# Patient Record
Sex: MALE | Employment: FULL TIME | URBAN - METROPOLITAN AREA
[De-identification: names, ages, dates, MRNs, and addresses within clinical notes are randomized per-mention and may not be internally consistent; named-entity substitution may affect disease eponyms.]

---

## 2018-09-11 ENCOUNTER — OFFICE VISIT (OUTPATIENT)
Dept: ORTHOPEDICS | Facility: CLINIC | Age: 43
End: 2018-09-11
Payer: COMMERCIAL

## 2018-09-11 VITALS
DIASTOLIC BLOOD PRESSURE: 71 MMHG | HEIGHT: 75 IN | BODY MASS INDEX: 24.25 KG/M2 | WEIGHT: 195 LBS | SYSTOLIC BLOOD PRESSURE: 122 MMHG

## 2018-09-11 DIAGNOSIS — M77.11 LATERAL EPICONDYLITIS OF RIGHT ELBOW: ICD-10-CM

## 2018-09-11 DIAGNOSIS — M22.2X1 PATELLOFEMORAL PAIN SYNDROME OF RIGHT KNEE: Primary | ICD-10-CM

## 2018-09-11 PROCEDURE — 99204 OFFICE O/P NEW MOD 45 MIN: CPT | Performed by: FAMILY MEDICINE

## 2018-09-11 NOTE — MR AVS SNAPSHOT
After Visit Summary   9/11/2018    Otoniel Montero    MRN: [id number]           Patient Information     Date Of Birth          1975        Visit Information        Provider Department      9/11/2018 2:40 PM Kvng Gastelum MD [facility] [place]        Today's Diagnoses     Patellofemoral pain syndrome of right knee    -  1    Lateral epicondylitis of right elbow           Follow-ups after your visit        Additional Services     JU[facility]U PT, HAND, AND CHIROPRACTIC REFERRAL       **This order will print in the [facility] Scheduling Office**    Physical Therapy, Hand Therapy and Chiropractic Care are available through:    *[facility]  *[facility]  *[facility]    Call one number to schedule at any of the above locations: [phone].    Your provider has referred you to: Physical Therapy at [facility] or [facility]    Has appt with Tammie in  already    Indication/Reason for Referral:    Patellofemoral pain syndrome of right knee      Onset of Illness:   Therapy Orders: Evaluate and Treat  Special Programs: None  Special Request: None    [place]      Additional Comments for the Therapist or Chiropractor:     Please be aware that coverage of these services is subject to the terms and limitations of your health insurance plan.  Call member services at your health plan with any benefit or coverage questions.      Please bring the following to your appointment:    *Your personal calendar for scheduling future appointments  *Comfortable clothing                  Your next 10 appointments already scheduled     Sep 19, 2018 12:00 PM CDT   (Arrive by 11:45 AM)   JUJU Running with Tammie Jones PT   [facility] ([facility] [place]  )    [address] [place] [address] [address] [place] [id number]   [phone]            Sep 27, 2018 10:40 AM CDT   JUJU Running with Tammie Jones PT  "  [place] [facility] ([facility]  )    [address]  [facility] [id number] [phone]            Oct 04, 2018 10:40 AM CDT   JUJU Running with Tammie Jones, PT   [place] [facility] ([facility]  )    [address]  [facility] [address]   [phone]              Who to contact     If you have questions or need follow up information about today's clinic visit or your schedule please contact [facility] [place] directly at [phone].  Normal or non-critical lab and imaging results will be communicated to you by MyChart, letter or phone within 4 business days after the clinic has received the results. If you do not hear from us within 7 days, please contact the clinic through Montrue Technologieshart or phone. If you have a critical or abnormal lab result, we will notify you by phone as soon as possible.  Submit refill requests through Nintu Oy or call your pharmacy and they will forward the refill request to us. Please allow 3 business days for your refill to be completed.          Additional Information About Your Visit        MyChart Information     [employer] lets you send messages to your doctor, view your test results, renew your prescriptions, schedule appointments and more. To sign up, go to [email or web address] . Click on \"Log in\" on the left side of the screen, which will take you to the Welcome page. Then click on \"Sign up Now\" on the right side of the page.     You will be asked to enter the access code listed below, as well as some personal information. Please follow the directions to create your username and password.     Your access code is: [email or web address]  Expires: 12/10/2018  3:40 PM     Your access code will  in 90 days. If you need help or a new code, please call your [place] clinic or [phone].        Care EveryWhere ID     This is your Care EveryWhere ID. This " "could be used by other organizations to access your [place] medical records  [id number]        Your Vitals Were     Height BMI (Body Mass Index)                6' 3\" (1.905 m) 24.37 kg/m2           Blood Pressure from Last 3 Encounters:   09/11/18 122/71    Weight from Last 3 Encounters:   09/11/18 195 lb (88.5 kg)              We Performed the Following     JUJU PT, HAND, AND CHIROPRACTIC REFERRAL        Primary Care Provider    None Specified       No primary provider on file.        Equal Access to Services     RONNY LOPEZ : Hadii aad ku hadasho Soomaali, waaxda luqadaha, qaybta kaalmada adeegyada, waxay stivenin haymarin ademurtaza corona lalexa . So [facility] [phone].    ATENCIÓN: Si habla español, tiene a humphrey disposición servicios gratuitos de asistencia lingüística. Llame al [phone].    We comply with applicable federal civil rights laws and [place] laws. We do not discriminate on the basis of race, color, national origin, age, disability, sex, sexual orientation, or gender identity.            Thank you!     Thank you for choosing [facility] [place]  for your care. Our goal is always to provide you with excellent care. Hearing back from our patients is one way we can continue to improve our services. Please take a few minutes to complete the written survey that you may receive in the mail after your visit with us. Thank you!             Your Updated Medication List - Protect others around you: Learn how to safely use, store and throw away your medicines at www.disposemymeds.org.      Notice  As of 9/11/2018  3:40 PM    You have not been prescribed any medications.      "

## 2018-09-11 NOTE — PROGRESS NOTES
[facility]   Clinic Visit s Sep 11, 2018    PCP: No primary care provider on file.      Otoniel is a 42 year old male who is seen as self referral for   Chief Complaint   Patient presents with     Right Knee - Pain       Injury: Reports insidious onset without acute precipitating event. Regular run. Out for 10 miles about a week ago, sudden onset anterolateral pain at mile 8, after picking up speed.        Location of Pain: right knee anterior  and lateral, nonradiating   Duration of Pain: 1 week(s)  Rating of Pain at worst: 0/10  Rating of Pain Currently: 0/10, pressure  Pain is better with: activity avoidance and rest   Pain is worse with: running   Treatment so far consists of: activity avoidance and rest  Associated symptoms: no distal numbness or tingling; denies swelling or warmth  Recent imaging completed: No recent imaging completed.  Prior History of related problems: PFS while in the [employer] 2006, 2 ankle scope surgeries.    Has PHYSICAL THERAPY with will set up.     Social History: is employed as a/an         Patient Active Problem List    Diagnosis Date Noted     Chronic pain of left ankle 07/11/2015     Priority: Medium     Disturbance of skin sensation 05/29/2015     Priority: Medium     Impingement of left ulnar nerve 05/29/2015     Priority: Medium     Cervicalgia 05/22/2015     Priority: Medium     Pain in thoracic spine 05/22/2015     Priority: Medium     Ulnar neuritis 05/22/2015     Priority: Medium     Pain in joint involving ankle and foot 05/22/2015     Priority: Medium       Family History   Problem Relation Age of Onset     Family history unknown: Yes     Past Surgical History:   Procedure Laterality Date     ANKLE SURGERY Left     x2, in army       Social History     Social History     Marital status:      Spouse name: N/A     Number of children: N/A     Years of education: N/A     Social History Main Topics     Smoking status: Never Smoker  "    Smokeless tobacco: Not on file     Alcohol use No       Review of Systems   Musculoskeletal: Positive for joint pain.   All other systems reviewed and are negative.        Physical Exam   Musculoskeletal:        Right elbow: Lateral epicondyle tenderness noted.     /71  Ht 6' 3\" (1.905 m)  Wt 195 lb (88.5 kg)  BMI 24.37 kg/m2  Constitutional:well-developed, well-nourished, and in no distress.   Cardiovascular: Intact distal pulses.    Neurological: alert. Gait Normal:   Gait, station, stance, and balance appear normal for age  Skin: Skin is warm and dry.   Psychiatric: Mood and affect normal.   Respiratory: unlabored, speaks in full sentences  Lymph: no LAD, no lymphangitis    Right Knee Exam   Swelling: None  Effusion: No    Tenderness   The patient is experiencing tenderness in the Lateral and medial patella facets.    Range of Motion   Extension: Normal  Flexion:     Normal    Tests   McMurrays:  Medial - Negative      Lateral - Negative  Lachman:  Anterior - Negative    Posterior - Negative  Drawer:       Anterior - Negative    Posterior - Negative  Varus:  Negative  Valgus: Negative  Pivot Shift: Negative  Patellar Apprehension: No    Right Elbow Exam     Tenderness   The patient is experiencing tenderness in the lateral epicondyle.    Range of Motion   Extension:  Normal  Flexion:      Normal  Pronation:   Normal  Supination: Normal    Muscle Strength   Wrist Extension: 5/5  Wrist Flexion:     5/5  :                   5/5  Pronation:           5/5  Supination:         5/5    Tests   Varus:   Negative  Valgus:  Negative  Tinels Sign (Cubital tunnel): n/t    Comments:  Posterior pain at full extension.  Pain with  and resisted wrist extension, full strength            ASSESSMENT/PLAN    ICD-10-CM    1. Patellofemoral pain syndrome of right knee M22.2X1 J[facility]U PT, HAND, AND CHIROPRACTIC REFERRAL   2. Lateral epicondylitis of right elbow M77.11          Explained nature of syndrome as a patella " "maltracking issue, now seemingly more related to poor hip mechanics affecting the biomechanical function of the entire leg, leaving the patella to move in an inappropriate or dysfunctional manner in the patellar groove. Physical therapy aimed at restoring correct patella tracking by restoring overall normal leg function recommended.  This includes avoidance of quad exercises especially leg extensions and emphasis on hip abductor and extensor strengthening. Noted mechanism for hip instability causing altered patellofemoral mechanics.  Recheck 4-6 weeks if not better.    Nature of injury discussed noting degenerative nature of these injuries as opposed to inflammatory conditions.  Treatment options reviewed including continued rest, therapy either home program or formal hand therapy referral, and injection of cortisone or irritant therapy protocols.    Declines hand therapy referral. Instead opts for home rehab program. Instructed in eccentric exercises, a \"longer and stronger\" concept, which is useful in building a stronger and more functional muscle and tendon unit. This can be performed with free weights, a urban twist device, or Therabands. Individualized rehab program designed for patient during visit.     Time spent in one-on-one evalution and discussion with a new patient to this clinic and so  I reviewed/updated the Past Medical History, the Family History and the Social History   regarding nature of problem, course, prior treatments, and therapeutic options, at least 50% of which was spent in counseling and coordination of care: 45 minutes.  "

## 2018-09-11 NOTE — Clinical Note
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 Intense but pleasant, and minimally symptomatic now.

## 2018-09-19 ENCOUNTER — THERAPY VISIT (OUTPATIENT)
Dept: PHYSICAL THERAPY | Facility: CLINIC | Age: 43
End: 2018-09-19
Payer: COMMERCIAL

## 2018-09-19 DIAGNOSIS — M25.561 RIGHT KNEE PAIN: Primary | ICD-10-CM

## 2018-09-19 PROCEDURE — 97110 THERAPEUTIC EXERCISES: CPT | Mod: GP | Performed by: PHYSICAL THERAPIST

## 2018-09-19 PROCEDURE — 97161 PT EVAL LOW COMPLEX 20 MIN: CPT | Mod: GP | Performed by: PHYSICAL THERAPIST

## 2018-09-19 ASSESSMENT — ACTIVITIES OF DAILY LIVING (ADL)
AS_A_RESULT_OF_YOUR_KNEE_INJURY,_HOW_WOULD_YOU_RATE_YOUR_CURRENT_LEVEL_OF_DAILY_ACTIVITY?: NORMAL
WEAKNESS: I DO NOT HAVE THE SYMPTOM
GIVING WAY, BUCKLING OR SHIFTING OF KNEE: I DO NOT HAVE THE SYMPTOM
STAND: ACTIVITY IS NOT DIFFICULT
HOW_WOULD_YOU_RATE_THE_OVERALL_FUNCTION_OF_YOUR_KNEE_DURING_YOUR_USUAL_DAILY_ACTIVITIES?: NEARLY NORMAL
SIT WITH YOUR KNEE BENT: ACTIVITY IS NOT DIFFICULT
KNEEL ON THE FRONT OF YOUR KNEE: ACTIVITY IS NOT DIFFICULT
SQUAT: ACTIVITY IS NOT DIFFICULT
HOW_WOULD_YOU_RATE_THE_CURRENT_FUNCTION_OF_YOUR_KNEE_DURING_YOUR_USUAL_DAILY_ACTIVITIES_ON_A_SCALE_FROM_0_TO_100_WITH_100_BEING_YOUR_LEVEL_OF_KNEE_FUNCTION_PRIOR_TO_YOUR_INJURY_AND_0_BEING_THE_INABILITY_TO_PERFORM_ANY_OF_YOUR_USUAL_DAILY_ACTIVITIES?: 10
KNEE_ACTIVITY_OF_DAILY_LIVING_SCORE: 100
STIFFNESS: I DO NOT HAVE THE SYMPTOM
RAW_SCORE: 70
PAIN: I DO NOT HAVE THE SYMPTOM
WALK: ACTIVITY IS NOT DIFFICULT
LIMPING: I DO NOT HAVE THE SYMPTOM
GO DOWN STAIRS: ACTIVITY IS NOT DIFFICULT
KNEE_ACTIVITY_OF_DAILY_LIVING_SUM: 70
GO UP STAIRS: ACTIVITY IS NOT DIFFICULT
RISE FROM A CHAIR: ACTIVITY IS NOT DIFFICULT
SWELLING: I DO NOT HAVE THE SYMPTOM

## 2018-09-19 NOTE — MR AVS SNAPSHOT
"              After Visit Summary   9/19/2018    Otoniel Montero    MRN: [id number]           Patient Information     Date Of Birth          1975        Visit Information        Provider Department      9/19/2018 12:00 PM Tammie Jones, PT [facility]        Today's Diagnoses     Right knee pain    -  1       Follow-ups after your visit        Your next 10 appointments already scheduled     Sep 27, 2018 10:40 AM CDT   JUJU Running with Tammie Jones PT   [place] [facility] ([facility] [place]  )    [address]  [facility] [id number] [phone]            Oct 04, 2018 10:40 AM CDT   JUJU Running with Tammie Jones PT   [place] [facility] ([facility] [place]  )    [address] [place] [address] [address]  [facility] [id number] [phone]              Who to contact     If you have questions or need follow up information about today's clinic visit or your schedule please contact [facility] PHYSICAL THERAPY directly at [phone].  Normal or non-critical lab and imaging results will be communicated to you by Fonmatchhart, letter or phone within 4 business days after the clinic has received the results. If you do not hear from us within 7 days, please contact the clinic through [employer] or phone. If you have a critical or abnormal lab result, we will notify you by phone as soon as possible.  Submit refill requests through [employer] or call your pharmacy and they will forward the refill request to us. Please allow 3 business days for your refill to be completed.          Additional Information About Your Visit        MyChart Information     X[employer]ia lets you send messages to your doctor, view your test results, renew your prescriptions, schedule appointments and more. To sign up, go to [email or web address] . Click on \"Log in\" on the left side of the screen, which " "will take you to the Welcome page. Then click on \"Sign up Now\" on the right side of the page.     You will be asked to enter the access code listed below, as well as some personal information. Please follow the directions to create your username and password.     Your access code is: [email or web address]  Expires: 12/10/2018  3:40 PM     Your access code will  in 90 days. If you need help or a new code, please call your [place] clinic or [phone].        Care EveryWhere ID     This is your Care EveryWhere ID. This could be used by other organizations to access your [place] medical records  [id number]         Blood Pressure from Last 3 Encounters:   18 122/71    Weight from Last 3 Encounters:   18 88.5 kg (195 lb)              We Performed the Following     HC PT EVAL, LOW COMPLEXITY     JUJU INITIAL EVAL REPORT     THERAPEUTIC EXERCISES        Primary Care Provider Fax #    Physician No Ref-Primary [phone]       No address on file        Equal Access to Services     RONNY LOPEZ : Hadii aad ku hadasho Soomaali, waaxda luqadaha, qaybta kaalmada adeegyada, waxay stivenin linda cordova . So [facility] [phone].    ATENCIÓN: Si habla español, tiene a humphrey disposición servicios gratuitos de asistencia lingüística. Llame al [phone].    We comply with applicable federal civil rights laws and [place] laws. We do not discriminate on the basis of race, color, national origin, age, disability, sex, sexual orientation, or gender identity.            Thank you!     Thank you for choosing [facility] PHYSICAL THERAPY  for your care. Our goal is always to provide you with excellent care. Hearing back from our patients is one way we can continue to improve our services. Please take a few minutes to complete the written survey that you may receive in the mail after your visit with us. Thank you!             Your Updated Medication List - Protect others around you: Learn " how to safely use, store and throw away your medicines at www.disposemymeds.org.      Notice  As of 9/19/2018 11:59 PM    You have not been prescribed any medications.

## 2018-09-24 NOTE — PROGRESS NOTES
[facility] Initial Evaluation  Subjective:  Patient is a 43 year old male presenting with rehab right knee hpi.           Pt reports onset of R inferior lateral patellar aching tight and occasionally sharp pain with running. He has had knee pain with previous running for work physical training 2 miles.  In June 2018 he increased running up to half marathon distance by early September. He has changed his training to attempt to run midfoot strike and is using HR monitor for training intensity. .          and reported as 1/10.   Pain is worse during the day.  Symptoms are exacerbated by running and standing and relieved by rest.  Since onset symptoms are gradually worsening.        General health as reported by patient is excellent.                      Red flags:  None as reported by the patient.                        Objective:  Standing Alignment:                  General deviations alignment: hips mild shift to tthe R, dec lumbar lordosis,  Gait:  R trunk rotation in mid to R terminal stance with increased stride, dec push off R R hip hike    Deviations:  General Deviations:  Stride length decr    Flexibility/Screens:   Positive screens:  Hip (mod dec R hip ER IR with firm end feel, ) and Lumbar (max loss R SLS lumbar ext, lumbar ext mod loss, side glide hips moving L min loss )    Lower Extremity:  Decreased left lower extremity flexibility:Hip ER's and Hip Flexors    Decreased right lower extremity flexibility:  Hip IR's; Hip ER's; Adductors; Piriformis and Hip Flexors          Ankle/Foot Evaluation  ROM:      PROM:                  Endfeel:  Firm end feel L DF min loss PROM                                                           Knee Evaluation:  ROM:      PROM      Extension: Left:   Right:  Min loss with firm end feel               Palpation:      Right knee tenderness present at:  IT Band (distal VL/ITB tenderness min restriction)            General     ROS    Assessment/Plan:     Patient is a 43 year old male with right side knee and left side ankle complaints.    Patient has the following significant findings with corresponding treatment plan.                Diagnosis 1:  R knee L ankle pain   Pain -  hot/cold therapy, manual therapy and self management  Decreased ROM/flexibility - manual therapy and therapeutic exercise  Decreased joint mobility - manual therapy and therapeutic exercise  Impaired gait - gait training  Impaired muscle performance - neuro re-education  Decreased function - therapeutic activities  Impaired posture - neuro re-education    Therapy Evaluation Codes:   1) History comprised of:   Personal factors that impact the plan of care:      None.    Comorbidity factors that impact the plan of care are:      None.     Medications impacting care: None.  2) Examination of Body Systems comprised of:   Body structures and functions that impact the plan of care:      Ankle, Hip, Knee, Lumbar spine and Pelvis.   Activity limitations that impact the plan of care are:      Sports, Standing and Walking.  3) Clinical presentation characteristics are:   Stable/Uncomplicated.  4) Decision-Making    Low complexity using standardized patient assessment instrument and/or measureable assessment of functional outcome.  Cumulative Therapy Evaluation is: Low complexity.    Previous and current functional limitations:  (See Goal Flow Sheet for this information)    Short term and Long term goals: (See Goal Flow Sheet for this information)     Communication ability:  Patient appears to be able to clearly communicate and understand verbal and written communication and follow directions correctly.  Treatment Explanation - The following has been discussed with the patient:   RX ordered/plan of care  Anticipated outcomes  Possible risks and side effects  This patient would benefit from PT intervention to resume normal activities.   Rehab potential is excellent.    Frequency:  1 X week, once  daily  Duration:  for 6 weeks  Discharge Plan:  Achieve all LTG.  Independent in home treatment program.  Reach maximal therapeutic benefit.    Please refer to the daily flowsheet for treatment today, total treatment time and time spent performing 1:1 timed codes.

## 2018-09-25 PROBLEM — M25.561 RIGHT KNEE PAIN: Status: ACTIVE | Noted: 2018-09-25

## 2018-09-25 NOTE — PROGRESS NOTES
[facility] Initial Evaluation  Subjective:  Patient is a 43 year old male presenting with rehab general hpi.   General   Past medical history: High eye pressure.  Other surgeries:  Orthopedic surgery and other (L angke scope [id number], lasik eye, tonsils 1977)  Medication history: Xalodan eye drop.  Occupation comment:  [employer]    What are your primary job tasks:  Driving, prolonged sitting and prolonged standing (computer work, pushing/pulling)                      Objective:  System    Physical Exam    General     ROS    Assessment/Plan:

## 2018-09-27 ENCOUNTER — THERAPY VISIT (OUTPATIENT)
Dept: PHYSICAL THERAPY | Facility: CLINIC | Age: 43
End: 2018-09-27
Payer: COMMERCIAL

## 2018-09-27 DIAGNOSIS — M25.561 RIGHT KNEE PAIN: ICD-10-CM

## 2018-09-27 PROCEDURE — 97110 THERAPEUTIC EXERCISES: CPT | Mod: GP | Performed by: PHYSICAL THERAPIST

## 2018-09-27 PROCEDURE — 97140 MANUAL THERAPY 1/> REGIONS: CPT | Mod: GP | Performed by: PHYSICAL THERAPIST

## 2018-10-04 ENCOUNTER — THERAPY VISIT (OUTPATIENT)
Dept: PHYSICAL THERAPY | Facility: CLINIC | Age: 43
End: 2018-10-04
Payer: COMMERCIAL

## 2018-10-04 DIAGNOSIS — M25.561 RIGHT KNEE PAIN: ICD-10-CM

## 2018-10-04 PROCEDURE — 97112 NEUROMUSCULAR REEDUCATION: CPT | Mod: GP | Performed by: PHYSICAL THERAPIST

## 2018-10-04 PROCEDURE — 97140 MANUAL THERAPY 1/> REGIONS: CPT | Mod: GP | Performed by: PHYSICAL THERAPIST

## 2018-10-04 PROCEDURE — 97110 THERAPEUTIC EXERCISES: CPT | Mod: GP | Performed by: PHYSICAL THERAPIST

## 2018-10-10 ENCOUNTER — THERAPY VISIT (OUTPATIENT)
Dept: PHYSICAL THERAPY | Facility: CLINIC | Age: 43
End: 2018-10-10
Payer: COMMERCIAL

## 2018-10-10 DIAGNOSIS — M25.561 RIGHT KNEE PAIN: ICD-10-CM

## 2018-10-10 PROCEDURE — 97110 THERAPEUTIC EXERCISES: CPT | Mod: GP | Performed by: PHYSICAL THERAPIST

## 2018-10-10 PROCEDURE — 97140 MANUAL THERAPY 1/> REGIONS: CPT | Mod: GP | Performed by: PHYSICAL THERAPIST

## 2018-10-18 ENCOUNTER — THERAPY VISIT (OUTPATIENT)
Dept: PHYSICAL THERAPY | Facility: CLINIC | Age: 43
End: 2018-10-18
Payer: COMMERCIAL

## 2018-10-18 DIAGNOSIS — M25.561 RIGHT KNEE PAIN: ICD-10-CM

## 2018-10-18 PROCEDURE — 97140 MANUAL THERAPY 1/> REGIONS: CPT | Mod: GP | Performed by: PHYSICAL THERAPIST

## 2018-10-18 PROCEDURE — 97110 THERAPEUTIC EXERCISES: CPT | Mod: GP | Performed by: PHYSICAL THERAPIST

## 2018-10-25 ENCOUNTER — THERAPY VISIT (OUTPATIENT)
Dept: PHYSICAL THERAPY | Facility: CLINIC | Age: 43
End: 2018-10-25
Payer: COMMERCIAL

## 2018-10-25 DIAGNOSIS — M25.561 RIGHT KNEE PAIN: ICD-10-CM

## 2018-10-25 PROCEDURE — 97110 THERAPEUTIC EXERCISES: CPT | Mod: GP | Performed by: PHYSICAL THERAPIST

## 2018-10-25 PROCEDURE — 97140 MANUAL THERAPY 1/> REGIONS: CPT | Mod: GP | Performed by: PHYSICAL THERAPIST

## 2019-01-10 ENCOUNTER — THERAPY VISIT (OUTPATIENT)
Dept: PHYSICAL THERAPY | Facility: CLINIC | Age: 44
End: 2019-01-10
Payer: COMMERCIAL

## 2019-01-10 DIAGNOSIS — M25.561 RIGHT KNEE PAIN: ICD-10-CM

## 2019-01-10 PROCEDURE — 97110 THERAPEUTIC EXERCISES: CPT | Mod: GP | Performed by: PHYSICAL THERAPIST

## 2019-01-10 PROCEDURE — 97140 MANUAL THERAPY 1/> REGIONS: CPT | Mod: GP | Performed by: PHYSICAL THERAPIST

## 2019-02-06 ENCOUNTER — THERAPY VISIT (OUTPATIENT)
Dept: PHYSICAL THERAPY | Facility: CLINIC | Age: 44
End: 2019-02-06
Payer: COMMERCIAL

## 2019-02-06 DIAGNOSIS — S93.409A ANKLE SPRAIN: ICD-10-CM

## 2019-02-06 PROCEDURE — 97161 PT EVAL LOW COMPLEX 20 MIN: CPT | Mod: GP | Performed by: PHYSICAL THERAPIST

## 2019-02-06 PROCEDURE — 97110 THERAPEUTIC EXERCISES: CPT | Mod: GP | Performed by: PHYSICAL THERAPIST

## 2019-02-11 PROBLEM — S93.409A ANKLE SPRAIN: Status: ACTIVE | Noted: 2019-02-11

## 2019-02-11 NOTE — PROGRESS NOTES
[facility] Initial Evaluation  Subjective:  Pt reports onset of L lateral ankle pain after landing from a move during a martial arts testing session on Jan 18, 2019. He notes that it was bruised and swollen after the injury. He has iced, rested, and now is returning to running. He has lateral and anterior ankle aching pain with stiffness going down stairs, and after running. He has had prior PT for R knee pain and had done well managing with HEP. He is working to run marathon distance.                             Objective:    Gait:      Deviations:  Ankle:  Push off decr LGeneral Deviations:  Stance time decr    Flexibility/Screens:   Positive screens:  Hip (mod loss R hip ER - has had rx focus R knee and hip) and Lumbar (mod loss ext )              Ankle/Foot Evaluation  ROM:      PROM:                  Endfeel:  Firm end feel min loss DF        PALPATION:   Left ankle tenderness present at:  gastroc/soleus and peroneals    EDEMA: Edema ankle: mild lateral L foot/ankle.          MOBILITY TESTING:       Talocrural Left: hypomobile      Subtalar Left: hypomobile          FUNCTIONAL TESTS:         Quad:  Single Leg Squat Left: Control is mild loss of control.  Single Leg Squat Right: Control is moderate loss of control                                                          General     ROS    Assessment/Plan:    Patient is a 43 year old male with left side ankle complaints.    Patient has the following significant findings with corresponding treatment plan.                Diagnosis 1:  Lateral ankle sprain  Pain -  hot/cold therapy, manual therapy and self management  Decreased ROM/flexibility - manual therapy and therapeutic exercise  Decreased joint mobility - manual therapy and therapeutic exercise  Decreased strength - therapeutic exercise and therapeutic activities  Edema - vasopneumatics, electric stimulation and cold therapy  Impaired gait - gait training  Impaired muscle performance -  neuro re-education  Decreased function - therapeutic activities  Instability -  Therapeutic Activity  Therapeutic Exercise    Therapy Evaluation Codes:     Cumulative Therapy Evaluation is: Low complexity.    Previous and current functional limitations:  (See Goal Flow Sheet for this information)    Short term and Long term goals: (See Goal Flow Sheet for this information)     Communication ability:  Patient appears to be able to clearly communicate and understand verbal and written communication and follow directions correctly.  Treatment Explanation - The following has been discussed with the patient:   RX ordered/plan of care  Anticipated outcomes  Possible risks and side effects  This patient would benefit from PT intervention to resume normal activities.   Rehab potential is excellent.    Frequency:  1 X week, once daily  Duration:  for 4 weeks  Discharge Plan:  Achieve all LTG.  Independent in home treatment program.  Reach maximal therapeutic benefit.    Please refer to the daily flowsheet for treatment today, total treatment time and time spent performing 1:1 timed codes.

## 2019-02-18 ENCOUNTER — THERAPY VISIT (OUTPATIENT)
Dept: PHYSICAL THERAPY | Facility: CLINIC | Age: 44
End: 2019-02-18
Payer: COMMERCIAL

## 2019-02-18 DIAGNOSIS — S93.409A ANKLE SPRAIN: ICD-10-CM

## 2019-02-18 PROCEDURE — 97110 THERAPEUTIC EXERCISES: CPT | Mod: GP | Performed by: PHYSICAL THERAPIST

## 2019-02-18 PROCEDURE — 97140 MANUAL THERAPY 1/> REGIONS: CPT | Mod: GP | Performed by: PHYSICAL THERAPIST

## 2019-02-18 PROCEDURE — 97112 NEUROMUSCULAR REEDUCATION: CPT | Mod: GP | Performed by: PHYSICAL THERAPIST

## 2019-03-12 ENCOUNTER — THERAPY VISIT (OUTPATIENT)
Dept: PHYSICAL THERAPY | Facility: CLINIC | Age: 44
End: 2019-03-12
Payer: COMMERCIAL

## 2019-03-12 DIAGNOSIS — S93.409A ANKLE SPRAIN: ICD-10-CM

## 2019-03-12 PROCEDURE — 97110 THERAPEUTIC EXERCISES: CPT | Mod: GP | Performed by: PHYSICAL THERAPIST

## 2019-03-12 PROCEDURE — 97140 MANUAL THERAPY 1/> REGIONS: CPT | Mod: GP | Performed by: PHYSICAL THERAPIST

## 2019-04-04 ENCOUNTER — THERAPY VISIT (OUTPATIENT)
Dept: PHYSICAL THERAPY | Facility: CLINIC | Age: 44
End: 2019-04-04
Payer: COMMERCIAL

## 2019-04-04 DIAGNOSIS — S93.409A ANKLE SPRAIN: ICD-10-CM

## 2019-04-04 PROCEDURE — 97140 MANUAL THERAPY 1/> REGIONS: CPT | Mod: GP | Performed by: PHYSICAL THERAPIST

## 2019-04-04 PROCEDURE — 97110 THERAPEUTIC EXERCISES: CPT | Mod: GP | Performed by: PHYSICAL THERAPIST

## 2019-04-30 ENCOUNTER — THERAPY VISIT (OUTPATIENT)
Dept: PHYSICAL THERAPY | Facility: CLINIC | Age: 44
End: 2019-04-30
Payer: COMMERCIAL

## 2019-04-30 DIAGNOSIS — S93.409A ANKLE SPRAIN: ICD-10-CM

## 2019-04-30 PROCEDURE — 97110 THERAPEUTIC EXERCISES: CPT | Mod: GP | Performed by: PHYSICAL THERAPIST

## 2019-04-30 PROCEDURE — 97140 MANUAL THERAPY 1/> REGIONS: CPT | Mod: GP | Performed by: PHYSICAL THERAPIST

## 2019-05-21 ENCOUNTER — THERAPY VISIT (OUTPATIENT)
Dept: PHYSICAL THERAPY | Facility: CLINIC | Age: 44
End: 2019-05-21
Payer: COMMERCIAL

## 2019-05-21 DIAGNOSIS — S93.409A ANKLE SPRAIN: ICD-10-CM

## 2019-05-21 PROCEDURE — 97110 THERAPEUTIC EXERCISES: CPT | Mod: GP | Performed by: PHYSICAL THERAPIST

## 2019-05-21 PROCEDURE — 97140 MANUAL THERAPY 1/> REGIONS: CPT | Mod: GP | Performed by: PHYSICAL THERAPIST

## 2019-05-30 ENCOUNTER — THERAPY VISIT (OUTPATIENT)
Dept: PHYSICAL THERAPY | Facility: CLINIC | Age: 44
End: 2019-05-30
Payer: COMMERCIAL

## 2019-05-30 DIAGNOSIS — S93.409A ANKLE SPRAIN: ICD-10-CM

## 2019-05-30 PROCEDURE — 97140 MANUAL THERAPY 1/> REGIONS: CPT | Mod: GP | Performed by: PHYSICAL THERAPIST

## 2019-05-30 PROCEDURE — 97112 NEUROMUSCULAR REEDUCATION: CPT | Mod: GP | Performed by: PHYSICAL THERAPIST

## 2019-05-30 PROCEDURE — 97110 THERAPEUTIC EXERCISES: CPT | Mod: GP | Performed by: PHYSICAL THERAPIST

## 2019-07-16 ENCOUNTER — THERAPY VISIT (OUTPATIENT)
Dept: PHYSICAL THERAPY | Facility: CLINIC | Age: 44
End: 2019-07-16
Payer: COMMERCIAL

## 2019-07-16 DIAGNOSIS — S93.409A ANKLE SPRAIN: ICD-10-CM

## 2019-07-16 PROCEDURE — 97110 THERAPEUTIC EXERCISES: CPT | Mod: GP | Performed by: PHYSICAL THERAPIST

## 2019-07-16 PROCEDURE — 97140 MANUAL THERAPY 1/> REGIONS: CPT | Mod: GP | Performed by: PHYSICAL THERAPIST

## 2019-07-16 PROCEDURE — 97530 THERAPEUTIC ACTIVITIES: CPT | Mod: GP | Performed by: PHYSICAL THERAPIST

## 2019-08-29 ENCOUNTER — THERAPY VISIT (OUTPATIENT)
Dept: PHYSICAL THERAPY | Facility: CLINIC | Age: 44
End: 2019-08-29
Payer: COMMERCIAL

## 2019-08-29 DIAGNOSIS — S93.409A ANKLE SPRAIN: ICD-10-CM

## 2019-08-29 PROCEDURE — 97110 THERAPEUTIC EXERCISES: CPT | Mod: GP | Performed by: PHYSICAL THERAPIST

## 2019-08-29 PROCEDURE — 97140 MANUAL THERAPY 1/> REGIONS: CPT | Mod: GP | Performed by: PHYSICAL THERAPIST

## 2019-09-23 ENCOUNTER — THERAPY VISIT (OUTPATIENT)
Dept: PHYSICAL THERAPY | Facility: CLINIC | Age: 44
End: 2019-09-23
Payer: COMMERCIAL

## 2019-09-23 DIAGNOSIS — S93.409A ANKLE SPRAIN: ICD-10-CM

## 2019-09-23 PROCEDURE — 97110 THERAPEUTIC EXERCISES: CPT | Mod: GP | Performed by: PHYSICAL THERAPIST

## 2019-09-23 PROCEDURE — 97140 MANUAL THERAPY 1/> REGIONS: CPT | Mod: GP | Performed by: PHYSICAL THERAPIST

## 2019-09-23 PROCEDURE — 97530 THERAPEUTIC ACTIVITIES: CPT | Mod: GP | Performed by: PHYSICAL THERAPIST

## 2019-11-05 ENCOUNTER — THERAPY VISIT (OUTPATIENT)
Dept: PHYSICAL THERAPY | Facility: CLINIC | Age: 44
End: 2019-11-05
Payer: COMMERCIAL

## 2019-11-05 DIAGNOSIS — S93.409A ANKLE SPRAIN: ICD-10-CM

## 2019-11-05 PROCEDURE — 97110 THERAPEUTIC EXERCISES: CPT | Mod: GP | Performed by: PHYSICAL THERAPIST

## 2019-11-05 PROCEDURE — 97140 MANUAL THERAPY 1/> REGIONS: CPT | Mod: GP | Performed by: PHYSICAL THERAPIST

## 2019-12-31 ENCOUNTER — THERAPY VISIT (OUTPATIENT)
Dept: PHYSICAL THERAPY | Facility: CLINIC | Age: 44
End: 2019-12-31
Payer: COMMERCIAL

## 2019-12-31 DIAGNOSIS — S93.409A ANKLE SPRAIN: ICD-10-CM

## 2019-12-31 PROCEDURE — 97140 MANUAL THERAPY 1/> REGIONS: CPT | Mod: GP | Performed by: PHYSICAL THERAPIST

## 2019-12-31 PROCEDURE — 97530 THERAPEUTIC ACTIVITIES: CPT | Mod: GP | Performed by: PHYSICAL THERAPIST

## 2019-12-31 PROCEDURE — 97110 THERAPEUTIC EXERCISES: CPT | Mod: GP | Performed by: PHYSICAL THERAPIST

## 2020-03-10 ENCOUNTER — THERAPY VISIT (OUTPATIENT)
Dept: PHYSICAL THERAPY | Facility: CLINIC | Age: 45
End: 2020-03-10
Payer: COMMERCIAL

## 2020-03-10 ENCOUNTER — HEALTH MAINTENANCE LETTER (OUTPATIENT)
Age: 45
End: 2020-03-10

## 2020-03-10 DIAGNOSIS — S93.409A ANKLE SPRAIN: ICD-10-CM

## 2020-03-10 PROCEDURE — 97140 MANUAL THERAPY 1/> REGIONS: CPT | Mod: GP | Performed by: PHYSICAL THERAPIST

## 2020-03-10 PROCEDURE — 97110 THERAPEUTIC EXERCISES: CPT | Mod: GP | Performed by: PHYSICAL THERAPIST

## 2020-03-10 PROCEDURE — 97112 NEUROMUSCULAR REEDUCATION: CPT | Mod: GP | Performed by: PHYSICAL THERAPIST

## 2020-03-25 ENCOUNTER — THERAPY VISIT (OUTPATIENT)
Dept: PHYSICAL THERAPY | Facility: CLINIC | Age: 45
End: 2020-03-25
Payer: COMMERCIAL

## 2020-03-25 DIAGNOSIS — S93.409A ANKLE SPRAIN: ICD-10-CM

## 2020-03-25 PROCEDURE — 98967 PH1 ASSMT&MGMT NQHP 11-20: CPT | Mod: GP | Performed by: PHYSICAL THERAPIST

## 2020-03-25 NOTE — PROGRESS NOTES
"The patient has been notified of following:     \"This virtual visit will be conducted between you and your provider. We have found that certain health care needs can be provided without the need for physical presence.  This service lets us provide the care you need with a short virtual visit.  At this time, you will not be charged for this visit as we do not have the processes in place to bill for virtual encounters.\"    Due to external, as well as internal [facility] management of COVID-19 Virus, Otoniel Montero was not seen in our clinic.  As a substitution, we implemented a virtual visit to manage this patient's condition utilizing the [employer] virtual visit platform via the patient s existing code.  Telephone Visit:  A telephone visit was used in conjunction with the online [employer] exercise platform.          S: Otoniel Montero is a 44 year old male. Connected virtually on the [employer] platform with Otoniel Montero to discuss their condition/progress. They noted improvements in Local ankle and Achilles soreness are mostly resolved with attention to pre activity HEP.  They noted ongoing pain or limitations with he had local post knee pain described in superior popliteal fossa after a run in new lower drop trail shoes. That soreness has lingered since onset 3-.   Current pain level: 2/10      O: Patient verbally demonstrated local popliteal fossa symptoms.L  HE was advised to wean more slowly to new shoes, ice prn and not over stretch the are, attention to warm up and cool down.       A:   Pt had onset of related but new location of symptoms.      P: Patient will continue with the exercise program assigned on their PTRx code and will add the following measures to manage their pain/condition: He was advised that prior to his next in clinic visit, he will need a new MD order for treatment.           Virtual visit contact time  Visit Started at 9:20  Visit Ended at 9:30  Total Time for set up, visit, and documentation 15 " minutes      Procedure Code/s (For internal tracking only, please document any charges you would apply)  Therapeutic Exercise (70276): 5 minutes.  Therapeutic Activities (46337): 5 minutes.    I have reviewed the note as documented above.  This accurately captures the substance of my conversation with the patient.    Signature:  Tammie Jones PT ATC      Any subjective info about the quality of the visit, ease of use: Pt very happy for the check in and info ahead of his next scheduled visit  Patient's opinion about reasonable cost for this visit

## 2020-03-26 ENCOUNTER — TELEPHONE (OUTPATIENT)
Dept: ORTHOPEDICS | Facility: CLINIC | Age: 45
End: 2020-03-26

## 2020-03-26 NOTE — TELEPHONE ENCOUNTER
Last office visit 9/11/18 for Right knee only    Phone call to patient. He states he wants physical therapy orders for both his left ankle (had 2 previous ankle scopes) and right knee. He understands if he needs to have a telephone visit. Informed that Dr. Gastelum is out of the office this week. Will get back with him on 3/30/20 when he returns to the office.     He can be reached at: [phone]  Ok to leave message : YES    Please advise.     HEMA Treviño RN

## 2020-03-26 NOTE — TELEPHONE ENCOUNTER
Reason for Call:  Other call back    Detailed comments: pt called and wondering if need to come and see the provider to get more order for his [facility] visit.    Pt is willing to do Telephone visit if applicable     pls call and let pt know what he needs to do     Phone Number Patient can be reached at: Cell number on file:    Telephone Information:   Mobile [phone]       Best Time: anytime     Can we leave a detailed message on this number? YES    Call taken on 3/26/2020 at 12:36 PM by DENISE UMAÑA

## 2020-03-31 ENCOUNTER — VIRTUAL VISIT (OUTPATIENT)
Dept: ORTHOPEDICS | Facility: CLINIC | Age: 45
End: 2020-03-31
Payer: COMMERCIAL

## 2020-03-31 VITALS — TEMPERATURE: 98 F | WEIGHT: 195 LBS | BODY MASS INDEX: 24.25 KG/M2 | HEIGHT: 75 IN

## 2020-03-31 DIAGNOSIS — M22.2X1 PATELLOFEMORAL PAIN SYNDROME OF RIGHT KNEE: ICD-10-CM

## 2020-03-31 DIAGNOSIS — M95.8 OSTEOCHONDRAL DEFECT OF ANKLE: Primary | ICD-10-CM

## 2020-03-31 PROCEDURE — 99213 OFFICE O/P EST LOW 20 MIN: CPT | Mod: TEL | Performed by: FAMILY MEDICINE

## 2020-03-31 RX ORDER — LATANOPROST 50 UG/ML
SOLUTION/ DROPS OPHTHALMIC
COMMUNITY
Start: 2020-02-24

## 2020-03-31 ASSESSMENT — MIFFLIN-ST. JEOR: SCORE: 1860.14

## 2020-03-31 NOTE — PROGRESS NOTES
"Otoniel Montero is a 44 year old male who is being evaluated via a billable telephone visit.      The patient has been notified of following:     \"This telephone visit will be conducted via a call between you and your physician/provider. We have found that certain health care needs can be provided without the need for a physical exam.  This service lets us provide the care you need with a short phone conversation.  If a prescription is necessary we can send it directly to your pharmacy.  If lab work is needed we can place an order for that and you can then stop by our lab to have the test done at a later time.    If during the course of the call the physician/provider feels a telephone visit is not appropriate, you will not be charged for this service.\"     Patient has given verbal consent for Telephone visit?  Yes    Otoniel Montero complains of    Chief Complaint   Patient presents with     Ankle/Foot left     Knee Pain     r        I have reviewed and updated the patient's Past Medical History, Social History, Family History and Medication List.    ALLERGIES  Patient has no known allergies.    Additional provider notes: pr reports osteochondral defect after slip on ice in 2011. Sprained it again last winter during a run. Had 2 scopes which included microfracturing. Has changed to anti inflammatory diet and changed to a more neutral running shoe from a more structured shoe. Rare soreness, but running ultra marathons, and needs continued formal PHYSICAL THERAPY help to maintain stability as he ramps up mileage.     Continues to benefit from PHYSICAL THERAPY for patellofemoral syndrome. Was told not to run in 1994 by  surgeon but PHYSICAL THERAPY has helped him.         Assessment/Plan:  ASSESSMENT/PLAN    ICD-10-CM    1. Osteochondral defect of ankle  M95.8 [facility], AND CHIROPRACTIC REFERRAL   2. Patellofemoral pain syndrome of right knee  M22.2X1 [facility] HAND, AND CHIROPRACTIC REFERRAL     Patient has " engaged in a number of lifestyle measures to maintain his running with good results, but continues to benefit from physical therapy to maintain his stability.  The ankle syndrome has not been evaluated previously here before, and we discussed the potential serious nature of this regarding his ankle cartilage, but currently he is symptom-free and functioning at a an extraordinarily high degree as long as he maintains his ankle stability which has been possible but continues to need physical therapy.  It is very reasonable for her to continue this and to maintain his high level of function.      Phone call duration: 15 minutes    Kvng Gastelum MD

## 2020-09-30 ENCOUNTER — THERAPY VISIT (OUTPATIENT)
Dept: PHYSICAL THERAPY | Facility: CLINIC | Age: 45
End: 2020-09-30
Attending: FAMILY MEDICINE
Payer: COMMERCIAL

## 2020-09-30 DIAGNOSIS — M95.8 OSTEOCHONDRAL DEFECT OF ANKLE: ICD-10-CM

## 2020-09-30 DIAGNOSIS — M22.2X1 PATELLOFEMORAL PAIN SYNDROME OF RIGHT KNEE: ICD-10-CM

## 2020-09-30 PROCEDURE — 97110 THERAPEUTIC EXERCISES: CPT | Mod: GP | Performed by: PHYSICAL THERAPIST

## 2020-09-30 PROCEDURE — 97161 PT EVAL LOW COMPLEX 20 MIN: CPT | Mod: GP | Performed by: PHYSICAL THERAPIST

## 2020-10-01 NOTE — PROGRESS NOTES
[facility] Initial Evaluation  Subjective:  Patient reports recurrent pain in left ankle and right knee with activity including running.  He has had physical therapy in the past to address these issues dating to March 2020.  There was a break in care due to COVID-19 restrictions.  He currently is limited with activities including running and walking, stairs, squatting.  He has swelling and aching into his left ankle post activity and sometimes some stiffness with moving sit to stand.  Right knee occasionally painful anterior medial with squat motions, weightbearing flexion.  He has done some independent distance running events.  He has plan set for a 50 mile race October 24.  He had not done as much training as was not expecting that rates to be held in its live version.                              Objective:  Standing Alignment:        Lumbar:  Lordosis decr            Gait:  Mild decreased pushoff left, mild decreased right knee extension at mid stance        Flexibility/Screens:   Positive screens:  Lumbar (Moderate loss lumbar extension, right single-leg lumbar extension with moderate right trunk rotation substitution)    Lower Extremity:  Decreased left lower extremity flexibility:Hip Flexors; Gastroc and Soleus    Decreased right lower extremity flexibility:  Hip IR's; Hip ER's and Hip Flexors                                                 Hip Evaluation  Hip PROM:                      Endfeel: Firm end range right hip ER minimal loss right IR moderate loss firm end feel                 Knee Evaluation:        Palpation:  Palpation of knee: Mild tenderness posterior medial right knee.      Edema:  Normal    Mobility Testing:  Mobility testing: Minimal loss right knee extension without reproduction of pain.  Firm slight springy end feel.            Functional Testing:          Quad:    Single Leg Squat:  Left:      Mild loss of control  Right:       Mild loss of control and decreased  hip/trunk flexion  Bilateral Leg Squat:                General     ROS    Assessment/Plan:    Patient is a 45 year old male with right side knee and left side ankle complaints.    Patient has the following significant findings with corresponding treatment plan.                Diagnosis 1: Right knee pain left ankle pain Pain -  hot/cold therapy, manual therapy and self management  Decreased ROM/flexibility - manual therapy and therapeutic exercise  Decreased joint mobility - manual therapy and therapeutic exercise  Impaired gait - gait training  Impaired muscle performance - neuro re-education  Decreased function - therapeutic activities  Impaired posture - neuro re-education    Therapy Evaluation Codes:   1) History comprised of:   Personal factors that impact the plan of care:      None.    Comorbidity factors that impact the plan of care are:      None.     Medications impacting care: None.  2) Examination of Body Systems comprised of:   Body structures and functions that impact the plan of care:      Ankle, Hip, Knee and Lumbar spine.   Activity limitations that impact the plan of care are:      Running, Sports, Squatting/kneeling, Stairs and Walking.  3) Clinical presentation characteristics are:   Stable/Uncomplicated.  4) Decision-Making    Low complexity using standardized patient assessment instrument and/or measureable assessment of functional outcome.  Cumulative Therapy Evaluation is: Low complexity.    Previous and current functional limitations:  (See Goal Flow Sheet for this information)    Short term and Long term goals: (See Goal Flow Sheet for this information)     Communication ability:  Patient appears to be able to clearly communicate and understand verbal and written communication and follow directions correctly.  Treatment Explanation - The following has been discussed with the patient:   RX ordered/plan of care  Anticipated outcomes  Possible risks and side effects  This patient would benefit  from PT intervention to resume normal activities.   Rehab potential is excellent.    Frequency:  1 X week, once daily  Duration:  for 4 weeks  Discharge Plan:  Achieve all LTG.  Independent in home treatment program.  Reach maximal therapeutic benefit.    Please refer to the daily flowsheet for treatment today, total treatment time and time spent performing 1:1 timed codes.

## 2020-10-01 NOTE — PROGRESS NOTES
[facility] Initial Evaluation  Subjective:    Patient Health History                     Surgeries include:  Orthopedic surgery (L ankle scope x2, tonsils, lasek).    Current medications: Xalatan eye drop.    Occupation: [employer].                                       Objective:  System    Physical Exam    General     ROS    Assessment/Plan:

## 2020-10-26 ENCOUNTER — THERAPY VISIT (OUTPATIENT)
Dept: PHYSICAL THERAPY | Facility: CLINIC | Age: 45
End: 2020-10-26
Payer: COMMERCIAL

## 2020-10-26 DIAGNOSIS — S93.409A ANKLE SPRAIN: Primary | ICD-10-CM

## 2020-10-26 DIAGNOSIS — M25.561 RIGHT KNEE PAIN: ICD-10-CM

## 2020-10-26 PROCEDURE — 97140 MANUAL THERAPY 1/> REGIONS: CPT | Mod: GP | Performed by: PHYSICAL THERAPIST

## 2020-10-26 PROCEDURE — 97110 THERAPEUTIC EXERCISES: CPT | Mod: GP | Performed by: PHYSICAL THERAPIST

## 2020-11-23 ENCOUNTER — THERAPY VISIT (OUTPATIENT)
Dept: PHYSICAL THERAPY | Facility: CLINIC | Age: 45
End: 2020-11-23
Payer: COMMERCIAL

## 2020-11-23 DIAGNOSIS — S93.409A ANKLE SPRAIN: Primary | ICD-10-CM

## 2020-11-23 PROCEDURE — 97140 MANUAL THERAPY 1/> REGIONS: CPT | Mod: GP | Performed by: PHYSICAL THERAPIST

## 2020-11-23 PROCEDURE — 97110 THERAPEUTIC EXERCISES: CPT | Mod: GP | Performed by: PHYSICAL THERAPIST

## 2020-12-27 ENCOUNTER — HEALTH MAINTENANCE LETTER (OUTPATIENT)
Age: 45
End: 2020-12-27

## 2021-01-04 ENCOUNTER — THERAPY VISIT (OUTPATIENT)
Dept: PHYSICAL THERAPY | Facility: CLINIC | Age: 46
End: 2021-01-04
Payer: COMMERCIAL

## 2021-01-04 DIAGNOSIS — M25.561 RIGHT KNEE PAIN: ICD-10-CM

## 2021-01-04 DIAGNOSIS — S93.409A ANKLE SPRAIN: Primary | ICD-10-CM

## 2021-01-04 PROCEDURE — 97140 MANUAL THERAPY 1/> REGIONS: CPT | Mod: GP | Performed by: PHYSICAL THERAPIST

## 2021-01-04 PROCEDURE — 97110 THERAPEUTIC EXERCISES: CPT | Mod: GP | Performed by: PHYSICAL THERAPIST

## 2021-01-18 ENCOUNTER — THERAPY VISIT (OUTPATIENT)
Dept: PHYSICAL THERAPY | Facility: CLINIC | Age: 46
End: 2021-01-18
Payer: COMMERCIAL

## 2021-01-18 DIAGNOSIS — M25.522 LEFT ELBOW PAIN: ICD-10-CM

## 2021-01-18 DIAGNOSIS — S52.122A LEFT RADIAL HEAD FRACTURE: Primary | ICD-10-CM

## 2021-01-18 PROCEDURE — 97161 PT EVAL LOW COMPLEX 20 MIN: CPT | Mod: GP | Performed by: PHYSICAL THERAPIST

## 2021-01-18 PROCEDURE — 97110 THERAPEUTIC EXERCISES: CPT | Mod: GP | Performed by: PHYSICAL THERAPIST

## 2021-01-19 NOTE — PROGRESS NOTES
[facility] Initial Evaluation  Subjective:  Patient reports onset of left elbow pain about 1 week ago 1/13/2021.  He was skateboarding in his garage and fell directly on lateral right elbow patient demonstrates having his arm tucked by his side when he fell.  He had x-ray confirmed radial head fracture and was advised no further intervention was needed.  Patient is wearing a stockinet for compression to help with swelling.  He notes he does not have pain when he keeps it flexed near 90 he does have pain on end range of active range of motion elbow and flexion and extension.  And any attempts at supination or pronation.  Patient is active with running and was currently advised to not run with his current injury as particularly to avoid falling again and also to avoid running compensations.                           Objective:  Standing Alignment:                  General deviations alignment: Left upper extremity increased carrying angle guarded at side with elbow flexed to 90, decreased trunk rotation with walking no arm swing left upper extremity.      Flexibility/Screens:   Cervical screen: Minimal loss cervical rotation left and right, patient denies hitting his head during the accident.                                  ROM:  AROM:      Flexion Elbow:  Right:100  Extension Elbow:  Right: -80      Supination Elbow/Wrist:  Right: Maximal loss  Pronation Elbow/Wrist:  Right: Moderate varus with all active range of motion        Pain: Global to proximal radial ulnar area left    Strength:  not assessed                        Palpation:    Left wrist/elbow tenderness present at: Lateral Epicondyle; Pronator Teres; Wrist Flexors; Wrist Extensors; Biceps (Distal) and Radial Head    Mobility:  Mobility testing elbow/wrist: Radial head mobility not assessed due to recent fracture, mobility appears to be limited body pain and edema, muscle guarding spasm.                                           General     ROS    Assessment/Plan:    Patient is a 45 year old male with left side elbow complaints.    Patient has the following significant findings with corresponding treatment plan.                Diagnosis 1: Left radial head fracture Pain -  hot/cold therapy, manual therapy and self management  Decreased ROM/flexibility - manual therapy and therapeutic exercise  Decreased joint mobility - manual therapy and therapeutic exercise  Decreased strength - therapeutic exercise and therapeutic activities  Edema - cold therapy  Impaired gait - gait training  Impaired muscle performance - neuro re-education  Decreased function - therapeutic activities  Impaired posture - neuro re-education    Therapy Evaluation Codes:   1) History comprised of:   Personal factors that impact the plan of care:      None.    Comorbidity factors that impact the plan of care are:      None.     Medications impacting care: None.  2) Examination of Body Systems comprised of:   Body structures and functions that impact the plan of care:      Elbow.   Activity limitations that impact the plan of care are:      Bathing, Cooking, Driving, Dressing, Grasping, Lifting and Sports.  3) Clinical presentation characteristics are:   Stable/Uncomplicated.  4) Decision-Making    Low complexity using standardized patient assessment instrument and/or measureable assessment of functional outcome.  Cumulative Therapy Evaluation is: Low complexity.    Previous and current functional limitations:  (See Goal Flow Sheet for this information)    Short term and Long term goals: (See Goal Flow Sheet for this information)     Communication ability:  Patient appears to be able to clearly communicate and understand verbal and written communication and follow directions correctly.  Treatment Explanation - The following has been discussed with the patient:   RX ordered/plan of care  Anticipated outcomes  Possible risks and side effects  This patient would  benefit from PT intervention to resume normal activities.   Rehab potential is excellent.    Frequency:  1 X week, once daily  Duration:  for 6 weeks  Discharge Plan:  Achieve all LTG.  Independent in home treatment program.  Reach maximal therapeutic benefit.    Please refer to the daily flowsheet for treatment today, total treatment time and time spent performing 1:1 timed codes.

## 2021-02-08 ENCOUNTER — THERAPY VISIT (OUTPATIENT)
Dept: PHYSICAL THERAPY | Facility: CLINIC | Age: 46
End: 2021-02-08
Payer: COMMERCIAL

## 2021-02-08 DIAGNOSIS — S52.122A LEFT RADIAL HEAD FRACTURE: Primary | ICD-10-CM

## 2021-02-08 PROCEDURE — 97140 MANUAL THERAPY 1/> REGIONS: CPT | Mod: GP | Performed by: PHYSICAL THERAPIST

## 2021-02-08 PROCEDURE — 97110 THERAPEUTIC EXERCISES: CPT | Mod: GP | Performed by: PHYSICAL THERAPIST

## 2021-02-15 ENCOUNTER — THERAPY VISIT (OUTPATIENT)
Dept: PHYSICAL THERAPY | Facility: CLINIC | Age: 46
End: 2021-02-15
Payer: COMMERCIAL

## 2021-02-15 DIAGNOSIS — S52.122A LEFT RADIAL HEAD FRACTURE: Primary | ICD-10-CM

## 2021-02-15 DIAGNOSIS — M25.522 LEFT ELBOW PAIN: ICD-10-CM

## 2021-02-15 PROCEDURE — 97140 MANUAL THERAPY 1/> REGIONS: CPT | Mod: GP | Performed by: PHYSICAL THERAPIST

## 2021-02-15 PROCEDURE — 97110 THERAPEUTIC EXERCISES: CPT | Mod: GP | Performed by: PHYSICAL THERAPIST

## 2021-02-22 ENCOUNTER — THERAPY VISIT (OUTPATIENT)
Dept: PHYSICAL THERAPY | Facility: CLINIC | Age: 46
End: 2021-02-22
Payer: COMMERCIAL

## 2021-02-22 DIAGNOSIS — S52.122A LEFT RADIAL HEAD FRACTURE: Primary | ICD-10-CM

## 2021-02-22 PROCEDURE — 97110 THERAPEUTIC EXERCISES: CPT | Mod: GP | Performed by: PHYSICAL THERAPIST

## 2021-02-22 PROCEDURE — 97140 MANUAL THERAPY 1/> REGIONS: CPT | Mod: GP | Performed by: PHYSICAL THERAPIST

## 2021-03-02 ENCOUNTER — THERAPY VISIT (OUTPATIENT)
Dept: PHYSICAL THERAPY | Facility: CLINIC | Age: 46
End: 2021-03-02
Payer: COMMERCIAL

## 2021-03-02 DIAGNOSIS — S52.122A LEFT RADIAL HEAD FRACTURE: Primary | ICD-10-CM

## 2021-03-02 PROCEDURE — 97140 MANUAL THERAPY 1/> REGIONS: CPT | Mod: GP | Performed by: PHYSICAL THERAPIST

## 2021-03-02 PROCEDURE — 97110 THERAPEUTIC EXERCISES: CPT | Mod: GP | Performed by: PHYSICAL THERAPIST

## 2021-03-09 ENCOUNTER — THERAPY VISIT (OUTPATIENT)
Dept: PHYSICAL THERAPY | Facility: CLINIC | Age: 46
End: 2021-03-09
Payer: COMMERCIAL

## 2021-03-09 DIAGNOSIS — S52.122A LEFT RADIAL HEAD FRACTURE: Primary | ICD-10-CM

## 2021-03-09 PROCEDURE — 97140 MANUAL THERAPY 1/> REGIONS: CPT | Mod: GP | Performed by: PHYSICAL THERAPIST

## 2021-03-09 PROCEDURE — 97110 THERAPEUTIC EXERCISES: CPT | Mod: GP | Performed by: PHYSICAL THERAPIST

## 2021-03-16 ENCOUNTER — THERAPY VISIT (OUTPATIENT)
Dept: PHYSICAL THERAPY | Facility: CLINIC | Age: 46
End: 2021-03-16
Payer: COMMERCIAL

## 2021-03-16 DIAGNOSIS — S52.122A LEFT RADIAL HEAD FRACTURE: Primary | ICD-10-CM

## 2021-03-16 PROCEDURE — 97140 MANUAL THERAPY 1/> REGIONS: CPT | Mod: GP | Performed by: PHYSICAL THERAPIST

## 2021-03-16 PROCEDURE — 97110 THERAPEUTIC EXERCISES: CPT | Mod: GP | Performed by: PHYSICAL THERAPIST

## 2021-03-19 ENCOUNTER — OFFICE VISIT (OUTPATIENT)
Dept: FAMILY MEDICINE | Facility: CLINIC | Age: 46
End: 2021-03-19
Payer: COMMERCIAL

## 2021-03-19 VITALS
HEART RATE: 62 BPM | TEMPERATURE: 97.5 F | OXYGEN SATURATION: 98 % | WEIGHT: 191 LBS | BODY MASS INDEX: 23.75 KG/M2 | DIASTOLIC BLOOD PRESSURE: 76 MMHG | SYSTOLIC BLOOD PRESSURE: 124 MMHG | HEIGHT: 75 IN

## 2021-03-19 DIAGNOSIS — R41.840 INATTENTION: Primary | ICD-10-CM

## 2021-03-19 PROCEDURE — 99214 OFFICE O/P EST MOD 30 MIN: CPT | Performed by: FAMILY MEDICINE

## 2021-03-19 ASSESSMENT — MIFFLIN-ST. JEOR: SCORE: 1837

## 2021-03-19 NOTE — PROGRESS NOTES
Assessment & Plan     Inattention  I have detailed discussion with the patient regarding underlying possible ADHD, however by interviewing I do not feel that he has any ADHD, however we do not have any formal testing for that.  If he want further evaluation, I would suggest him to do formal testing for ADHD which is to be done by psychologist.  At this point after detailed discussion, we decided to watch the symptoms.  No medication or diagnosis was made as I cannot make that assumption with clinical information .  I do not find any symptoms of anxiety or depression from the patient.  He has continued to be functioning pretty much normal at his current jess function job  I advised patient if anything change over time or new symptoms he should contact his primary care so that further evaluation can be done.      Return in about 6 months (around 9/19/2021) for if symptom does not get better.    Jay Gonzales MD  [facility] JAGDISH Frederick is a 45 year old who presents for the following health issues     HPI   -discuss Adult ADHD testing - his son was diagnosed and everything he has as symptoms the patient also has  Patient is a very pleasant 45-year-old male who came today to discuss if he should get tested for inattention.  Apparently he did not have any symptoms at this time ,he he is able to function at his job at high level without any issues.  His job does involve functioning at a high level and he is able to do that.  Son recently diagnosed with ADHD and he thinks sometime he has some issues related to inattention He denies any depression anxiety.  He was able to finish college without any issues.  Never took any medications as per patient.          Review of Systems   Constitutional, HEENT, cardiovascular, pulmonary, gi and gu systems are negative, except as otherwise noted.      Objective    /76 (BP Location: Right arm, Cuff Size: Adult Regular)   Pulse 62   Temp  "97.5  F (36.4  C) (Tympanic)   Ht 1.905 m (6' 3\")   Wt 86.6 kg (191 lb)   SpO2 98%   BMI 23.87 kg/m    Body mass index is 23.87 kg/m .  Physical Exam   GENERAL: healthy, alert and no distress  NECK: no adenopathy, no asymmetry, masses, or scars and thyroid normal to palpation  RESP: lungs clear to auscultation - no rales, rhonchi or wheezes  CV: regular rate and rhythm, normal S1 S2, no S3 or S4, no murmur, click or rub, no peripheral edema and peripheral pulses strong  MS: no gross musculoskeletal defects noted, no edema  PSYCH: mentation appears normal, affect normal/bright            "

## 2021-03-23 ENCOUNTER — THERAPY VISIT (OUTPATIENT)
Dept: PHYSICAL THERAPY | Facility: CLINIC | Age: 46
End: 2021-03-23
Payer: COMMERCIAL

## 2021-03-23 DIAGNOSIS — S52.122A LEFT RADIAL HEAD FRACTURE: Primary | ICD-10-CM

## 2021-03-23 PROCEDURE — 97140 MANUAL THERAPY 1/> REGIONS: CPT | Mod: GP | Performed by: PHYSICAL THERAPIST

## 2021-03-23 PROCEDURE — 97110 THERAPEUTIC EXERCISES: CPT | Mod: GP | Performed by: PHYSICAL THERAPIST

## 2021-03-23 NOTE — LETTER
[facility]  [address]  [place] [address]  [phone]    2021    Re: Geraldo Montero   :   1975  MRN:  [id number]   REFERRING PHYSICIAN:   José Luis SMITH [facility]    Date of Initial Evaluation:  21  Visits:     Reason for Referral:  Left radial head fracture    EVALUATION SUMMARY    PROGRESS  REPORT  Progress reporting period is from initial eval/last PN to Mar 23, 2021.       SUBJECTIVE    Subjective changes noted by patient:  Medical device representative contacted for dynamic brace/splint with measurements taken today awaiting return message to order fit and instructed..  Patient continues to be diligent with his exercises feels stiff is difficult to monitor changes following his using upper extremity more functionally with the exception of limitation with weightbearing extension. He notes soreness in elbow lateral and posterior elbow with distance running, he is working on relaxed smooth arm swing.   .  Changes in function:  Yes (See Goal flowsheet attached for changes in current functional level)  Adverse reaction to treatment or activity: None    OBJECTIVE  Objective: PROM flexion supine posttreatment 136 PROM Extension posttreatment supine -8, supination at endrange extension and at 90 degrees of flexion minimal loss     ASSESSMENT/PLAN  Updated problem list and treatment plan: {Diagnosis: L radial head fx   STG/LTGs have been met or progress has been made towards goals:  Yes (See Goal flow sheet completed today.)  Assessment of Progress: The patient's condition is improving.  The patient's condition has potential to improve.  Self Management Plans:  Patient has been instructed in a home treatment program.  I have re-evaluated this patient and find that the nature, scope, duration and intensity of the therapy is appropriate for the medical condition of the patient.   continues  to require the following intervention to meet STG and LTG's:  PT            Re: Geraldo Montero   :   1975    Recommendations:  Evaluation and instruction for dynamic splint as per MD order  This patient would benefit from continued therapy.     Frequency:  1 X week, once daily  Duration:  for 4 weeks    Thank you for your referral.    INQUIRIES  Therapist: Tammie Jones, PT, ATC   [address] [address]  Phone: [phone]  Fax: [phone]

## 2021-03-25 NOTE — PROGRESS NOTES
PROGRESS  REPORT    Progress reporting period is from initial eval/last PN to Mar 23, 2021.       SUBJECTIVE    Subjective changes noted by patient:  Medical device representative contacted for dynamic brace/splint with measurements taken today awaiting return message to order fit and instructed..  Patient continues to be diligent with his exercises feels stiff is difficult to monitor changes following his using upper extremity more functionally with the exception of limitation with weightbearing extension. He notes soreness in elbow lateral and posterior elbow with distance running, he is working on relaxed smooth arm swing.   .  Changes in function:  Yes (See Goal flowsheet attached for changes in current functional level)  Adverse reaction to treatment or activity: None    OBJECTIVE    Objective: PROM flexion supine posttreatment 136 PROM Extension posttreatment supine -8, supination at endrange extension and at 90 degrees of flexion minimal loss     ASSESSMENT/PLAN  Updated problem list and treatment plan: {Diagnosis: L radial head fx   STG/LTGs have been met or progress has been made towards goals:  Yes (See Goal flow sheet completed today.)  Assessment of Progress: The patient's condition is improving.  The patient's condition has potential to improve.  Self Management Plans:  Patient has been instructed in a home treatment program.  I have re-evaluated this patient and find that the nature, scope, duration and intensity of the therapy is appropriate for the medical condition of the patient.   continues to require the following intervention to meet STG and LTG's:  PT    Recommendations:  Evaluation and instruction for dynamic splint as per MD order  This patient would benefit from continued therapy.     Frequency:  1 X week, once daily  Duration:  for 4 weeks        Please refer to the daily flowsheet for treatment today, total treatment time and time spent performing 1:1 timed codes.

## 2021-03-30 ENCOUNTER — THERAPY VISIT (OUTPATIENT)
Dept: PHYSICAL THERAPY | Facility: CLINIC | Age: 46
End: 2021-03-30
Payer: COMMERCIAL

## 2021-03-30 DIAGNOSIS — S52.122A LEFT RADIAL HEAD FRACTURE: Primary | ICD-10-CM

## 2021-03-30 PROCEDURE — 97140 MANUAL THERAPY 1/> REGIONS: CPT | Mod: GP | Performed by: PHYSICAL THERAPIST

## 2021-03-30 PROCEDURE — 97110 THERAPEUTIC EXERCISES: CPT | Mod: GP | Performed by: PHYSICAL THERAPIST

## 2021-04-07 ENCOUNTER — THERAPY VISIT (OUTPATIENT)
Dept: PHYSICAL THERAPY | Facility: CLINIC | Age: 46
End: 2021-04-07
Payer: COMMERCIAL

## 2021-04-07 DIAGNOSIS — S52.122A LEFT RADIAL HEAD FRACTURE: Primary | ICD-10-CM

## 2021-04-07 PROCEDURE — 97110 THERAPEUTIC EXERCISES: CPT | Mod: GP | Performed by: PHYSICAL THERAPIST

## 2021-04-07 PROCEDURE — 97140 MANUAL THERAPY 1/> REGIONS: CPT | Mod: GP | Performed by: PHYSICAL THERAPIST

## 2021-04-12 ENCOUNTER — THERAPY VISIT (OUTPATIENT)
Dept: PHYSICAL THERAPY | Facility: CLINIC | Age: 46
End: 2021-04-12
Payer: COMMERCIAL

## 2021-04-12 DIAGNOSIS — S52.122A LEFT RADIAL HEAD FRACTURE: Primary | ICD-10-CM

## 2021-04-12 PROCEDURE — 97140 MANUAL THERAPY 1/> REGIONS: CPT | Mod: GP | Performed by: PHYSICAL THERAPIST

## 2021-04-12 PROCEDURE — 97110 THERAPEUTIC EXERCISES: CPT | Mod: GP | Performed by: PHYSICAL THERAPIST

## 2021-04-24 ENCOUNTER — HEALTH MAINTENANCE LETTER (OUTPATIENT)
Age: 46
End: 2021-04-24

## 2021-05-25 ENCOUNTER — THERAPY VISIT (OUTPATIENT)
Dept: PHYSICAL THERAPY | Facility: CLINIC | Age: 46
End: 2021-05-25
Payer: COMMERCIAL

## 2021-05-25 DIAGNOSIS — S52.122A LEFT RADIAL HEAD FRACTURE: Primary | ICD-10-CM

## 2021-05-25 PROCEDURE — 97110 THERAPEUTIC EXERCISES: CPT | Mod: GP | Performed by: PHYSICAL THERAPIST

## 2021-05-25 PROCEDURE — 97140 MANUAL THERAPY 1/> REGIONS: CPT | Mod: GP | Performed by: PHYSICAL THERAPIST

## 2021-06-07 ENCOUNTER — THERAPY VISIT (OUTPATIENT)
Dept: PHYSICAL THERAPY | Facility: CLINIC | Age: 46
End: 2021-06-07
Payer: COMMERCIAL

## 2021-06-07 DIAGNOSIS — S52.122A LEFT RADIAL HEAD FRACTURE: Primary | ICD-10-CM

## 2021-06-07 PROCEDURE — 97110 THERAPEUTIC EXERCISES: CPT | Mod: GP | Performed by: PHYSICAL THERAPIST

## 2021-06-07 PROCEDURE — 97140 MANUAL THERAPY 1/> REGIONS: CPT | Mod: GP | Performed by: PHYSICAL THERAPIST

## 2021-06-30 ENCOUNTER — THERAPY VISIT (OUTPATIENT)
Dept: PHYSICAL THERAPY | Facility: CLINIC | Age: 46
End: 2021-06-30
Payer: COMMERCIAL

## 2021-06-30 DIAGNOSIS — S52.122A LEFT RADIAL HEAD FRACTURE: Primary | ICD-10-CM

## 2021-06-30 PROCEDURE — 97140 MANUAL THERAPY 1/> REGIONS: CPT | Mod: GP | Performed by: PHYSICAL THERAPIST

## 2021-06-30 PROCEDURE — 97110 THERAPEUTIC EXERCISES: CPT | Mod: GP | Performed by: PHYSICAL THERAPIST

## 2021-07-08 ENCOUNTER — THERAPY VISIT (OUTPATIENT)
Dept: PHYSICAL THERAPY | Facility: CLINIC | Age: 46
End: 2021-07-08
Payer: COMMERCIAL

## 2021-07-08 DIAGNOSIS — S52.122A LEFT RADIAL HEAD FRACTURE: Primary | ICD-10-CM

## 2021-07-08 PROCEDURE — 97140 MANUAL THERAPY 1/> REGIONS: CPT | Mod: GP | Performed by: PHYSICAL THERAPIST

## 2021-07-08 PROCEDURE — 97110 THERAPEUTIC EXERCISES: CPT | Mod: GP | Performed by: PHYSICAL THERAPIST

## 2021-07-19 ENCOUNTER — THERAPY VISIT (OUTPATIENT)
Dept: PHYSICAL THERAPY | Facility: CLINIC | Age: 46
End: 2021-07-19
Payer: COMMERCIAL

## 2021-07-19 DIAGNOSIS — S52.122A LEFT RADIAL HEAD FRACTURE: Primary | ICD-10-CM

## 2021-07-19 PROCEDURE — 97110 THERAPEUTIC EXERCISES: CPT | Mod: GP | Performed by: PHYSICAL THERAPIST

## 2021-07-19 PROCEDURE — 97035 APP MDLTY 1+ULTRASOUND EA 15: CPT | Mod: GP | Performed by: PHYSICAL THERAPIST

## 2021-07-19 PROCEDURE — 97140 MANUAL THERAPY 1/> REGIONS: CPT | Mod: GP | Performed by: PHYSICAL THERAPIST

## 2021-08-31 ENCOUNTER — THERAPY VISIT (OUTPATIENT)
Dept: PHYSICAL THERAPY | Facility: CLINIC | Age: 46
End: 2021-08-31
Payer: COMMERCIAL

## 2021-08-31 DIAGNOSIS — S52.122A LEFT RADIAL HEAD FRACTURE: Primary | ICD-10-CM

## 2021-08-31 PROCEDURE — 97140 MANUAL THERAPY 1/> REGIONS: CPT | Mod: GP | Performed by: PHYSICAL THERAPIST

## 2021-08-31 PROCEDURE — 97110 THERAPEUTIC EXERCISES: CPT | Mod: GP | Performed by: PHYSICAL THERAPIST

## 2021-09-14 ENCOUNTER — THERAPY VISIT (OUTPATIENT)
Dept: PHYSICAL THERAPY | Facility: CLINIC | Age: 46
End: 2021-09-14
Payer: COMMERCIAL

## 2021-09-14 DIAGNOSIS — S52.122A LEFT RADIAL HEAD FRACTURE: Primary | ICD-10-CM

## 2021-09-14 PROCEDURE — 97110 THERAPEUTIC EXERCISES: CPT | Mod: GP | Performed by: PHYSICAL THERAPIST

## 2021-09-14 PROCEDURE — 97140 MANUAL THERAPY 1/> REGIONS: CPT | Mod: GP | Performed by: PHYSICAL THERAPIST

## 2021-09-28 ENCOUNTER — THERAPY VISIT (OUTPATIENT)
Dept: PHYSICAL THERAPY | Facility: CLINIC | Age: 46
End: 2021-09-28
Payer: COMMERCIAL

## 2021-09-28 DIAGNOSIS — S52.122A LEFT RADIAL HEAD FRACTURE: Primary | ICD-10-CM

## 2021-09-28 PROCEDURE — 97110 THERAPEUTIC EXERCISES: CPT | Mod: GP | Performed by: PHYSICAL THERAPIST

## 2021-09-28 PROCEDURE — 97140 MANUAL THERAPY 1/> REGIONS: CPT | Mod: GP | Performed by: PHYSICAL THERAPIST

## 2021-10-03 ENCOUNTER — HEALTH MAINTENANCE LETTER (OUTPATIENT)
Age: 46
End: 2021-10-03

## 2021-10-12 ENCOUNTER — THERAPY VISIT (OUTPATIENT)
Dept: PHYSICAL THERAPY | Facility: CLINIC | Age: 46
End: 2021-10-12
Payer: COMMERCIAL

## 2021-10-12 DIAGNOSIS — S52.122A LEFT RADIAL HEAD FRACTURE: Primary | ICD-10-CM

## 2021-10-12 PROCEDURE — 97140 MANUAL THERAPY 1/> REGIONS: CPT | Mod: GP | Performed by: PHYSICAL THERAPIST

## 2021-10-12 PROCEDURE — 97110 THERAPEUTIC EXERCISES: CPT | Mod: GP | Performed by: PHYSICAL THERAPIST

## 2021-10-26 ENCOUNTER — THERAPY VISIT (OUTPATIENT)
Dept: PHYSICAL THERAPY | Facility: CLINIC | Age: 46
End: 2021-10-26
Payer: COMMERCIAL

## 2021-10-26 DIAGNOSIS — S52.122A LEFT RADIAL HEAD FRACTURE: Primary | ICD-10-CM

## 2021-10-26 PROCEDURE — 97140 MANUAL THERAPY 1/> REGIONS: CPT | Mod: GP | Performed by: PHYSICAL THERAPIST

## 2021-10-26 PROCEDURE — 97110 THERAPEUTIC EXERCISES: CPT | Mod: GP | Performed by: PHYSICAL THERAPIST

## 2021-12-01 ENCOUNTER — THERAPY VISIT (OUTPATIENT)
Dept: PHYSICAL THERAPY | Facility: CLINIC | Age: 46
End: 2021-12-01
Payer: COMMERCIAL

## 2021-12-01 DIAGNOSIS — S52.122A LEFT RADIAL HEAD FRACTURE: Primary | ICD-10-CM

## 2021-12-01 PROCEDURE — 97140 MANUAL THERAPY 1/> REGIONS: CPT | Mod: GP | Performed by: PHYSICAL THERAPIST

## 2021-12-01 PROCEDURE — 97110 THERAPEUTIC EXERCISES: CPT | Mod: GP | Performed by: PHYSICAL THERAPIST

## 2021-12-29 ENCOUNTER — THERAPY VISIT (OUTPATIENT)
Dept: PHYSICAL THERAPY | Facility: CLINIC | Age: 46
End: 2021-12-29
Payer: COMMERCIAL

## 2021-12-29 DIAGNOSIS — S52.122A LEFT RADIAL HEAD FRACTURE: Primary | ICD-10-CM

## 2021-12-29 PROCEDURE — 97140 MANUAL THERAPY 1/> REGIONS: CPT | Mod: GP | Performed by: PHYSICAL THERAPIST

## 2021-12-29 PROCEDURE — 97110 THERAPEUTIC EXERCISES: CPT | Mod: GP | Performed by: PHYSICAL THERAPIST

## 2022-03-03 ENCOUNTER — THERAPY VISIT (OUTPATIENT)
Dept: PHYSICAL THERAPY | Facility: CLINIC | Age: 47
End: 2022-03-03
Payer: COMMERCIAL

## 2022-03-03 DIAGNOSIS — S52.122A LEFT RADIAL HEAD FRACTURE: Primary | ICD-10-CM

## 2022-03-03 PROCEDURE — 97110 THERAPEUTIC EXERCISES: CPT | Mod: GP | Performed by: PHYSICAL THERAPIST

## 2022-03-03 PROCEDURE — 97140 MANUAL THERAPY 1/> REGIONS: CPT | Mod: GP | Performed by: PHYSICAL THERAPIST

## 2022-04-11 ENCOUNTER — THERAPY VISIT (OUTPATIENT)
Dept: PHYSICAL THERAPY | Facility: CLINIC | Age: 47
End: 2022-04-11
Payer: COMMERCIAL

## 2022-04-11 DIAGNOSIS — S52.122A LEFT RADIAL HEAD FRACTURE: Primary | ICD-10-CM

## 2022-04-11 PROCEDURE — 97110 THERAPEUTIC EXERCISES: CPT | Mod: GP | Performed by: PHYSICAL THERAPIST

## 2022-04-11 PROCEDURE — 97140 MANUAL THERAPY 1/> REGIONS: CPT | Mod: GP | Performed by: PHYSICAL THERAPIST

## 2022-05-10 ENCOUNTER — THERAPY VISIT (OUTPATIENT)
Dept: PHYSICAL THERAPY | Facility: CLINIC | Age: 47
End: 2022-05-10
Payer: COMMERCIAL

## 2022-05-10 DIAGNOSIS — S52.122A LEFT RADIAL HEAD FRACTURE: Primary | ICD-10-CM

## 2022-05-10 PROCEDURE — 97140 MANUAL THERAPY 1/> REGIONS: CPT | Mod: GP | Performed by: PHYSICAL THERAPIST

## 2022-05-10 PROCEDURE — 97110 THERAPEUTIC EXERCISES: CPT | Mod: GP | Performed by: PHYSICAL THERAPIST

## 2022-05-15 ENCOUNTER — HEALTH MAINTENANCE LETTER (OUTPATIENT)
Age: 47
End: 2022-05-15

## 2022-05-31 ENCOUNTER — THERAPY VISIT (OUTPATIENT)
Dept: PHYSICAL THERAPY | Facility: CLINIC | Age: 47
End: 2022-05-31
Payer: COMMERCIAL

## 2022-05-31 DIAGNOSIS — M72.2 PLANTAR FASCIITIS: ICD-10-CM

## 2022-05-31 PROCEDURE — 97161 PT EVAL LOW COMPLEX 20 MIN: CPT | Mod: GP | Performed by: PHYSICAL THERAPIST

## 2022-05-31 PROCEDURE — 97110 THERAPEUTIC EXERCISES: CPT | Mod: GP | Performed by: PHYSICAL THERAPIST

## 2022-05-31 NOTE — LETTER
[facility]  [address] [place] [address]  [facility] [address]  [phone]    2022    Re: Geraldo SMITH Winston   :   1975  MRN:  [id number]   REFERRING PHYSICIAN:   José Luis SMITH [facility]    Date of Initial Evaluation:  22  Visits:  Rxs Used: 1  Reason for Referral:  Plantar fasciitis    Physical Therapy Initial Evaluation  Subjective:  Pt reports insidious onset of L central  heel pain about three months ago, 2022. He is runner,  And has completed ultra marathon distances. Hx of toe paresthesias in running event. He has progressively regained feeling in toes.  Pt also has hx of L ankle surgery 2x.   He is currently limited with WTB and walking in AM and after sitting for >1 hour. Wearing flip flops or shoes decreases the post sitting heel soreness.     The history is provided by the patient. Surgeries: L ankle scope 2x, lasik, tonsils .    Current medications: Latanoprost eye drop.    Current occupation is [employer].                   Objective:  Standing Alignment:    Lumbar:  Lordosis decr  Gait:  Dec stance time L, dec stride length L , dec medial push off L   Deviations:  Ankle:  Push off decr L and heel strike decr L  Flexibility/Screens:   Positive screens:  Hip (mod loss L hip ER firm end feel ) and Lumbar (mod loss lumbar ext)  Lower Extremity:  Decreased left lower extremity flexibility:Hip IR's; Hip ER's; Hip Flexors; Gastroc and Soleus  Decreased right lower extremity flexibility:  Hip ER's; Hip Flexors and Soleus    Ankle/Foot Evaluation  ROM:    AROM:    Dorsiflexion: Left:   Mod loss vs R  Right:    Eversion: Mod loss     Right:   PROM:    Endfeel:  Firm end feel L TCJ DF, rearfoot mod loss firm end feel eversion L,   PALPATION:   Left ankle tenderness present at:  gastroc/soleus; plantar fascia and peroneals  Re: Geraldo SARAH Winston   :   1975    EDEMA: Edema  ankle: mild L TCJ lateral      MOBILITY TESTING:   Tib-Fib Distal Left: hypomobile      Talocrural Left: hypomobile      Subtalar Left: hypomobile      Midtarsal Left: hypomobile      First Ray Left: hypomobile         General   ROS    Assessment/Plan:    Patient is a 46 year old male with L foot complaints.    Patient has the following significant findings with corresponding treatment plan.                Diagnosis 1:  Plantar fasciitis L   Pain -  hot/cold therapy, manual therapy and self management  Decreased ROM/flexibility - manual therapy and therapeutic exercise  Decreased joint mobility - manual therapy and therapeutic exercise  Impaired gait - gait training  Impaired muscle performance - neuro re-education  Decreased function - therapeutic activities    Therapy Evaluation Codes:   1) History comprised of:   Personal factors that impact the plan of care:      None.    Comorbidity factors that impact the plan of care are:      None.     Medications impacting care: None.  2) Examination of Body Systems comprised of:   Body structures and functions that impact the plan of care:      Ankle, Hip and Lumbar spine.   Activity limitations that impact the plan of care are:      Running, Sitting, Sports, Stairs, Standing and Walking.  3) Clinical presentation characteristics are:   Stable/Uncomplicated.  4) Decision-Making    Low complexity using standardized patient assessment instrument and/or measureable assessment of functional outcome.  Cumulative Therapy Evaluation is: Low complexity.  Communication ability:  Patient appears to be able to clearly communicate and understand verbal and written communication and follow directions correctly.  Treatment Explanation - The following has been discussed with the patient:   RX ordered/plan of care  Anticipated outcomes  Possible risks and side effects            Re: Geraldo Montero   :   1975    This patient would benefit from PT intervention to resume  normal activities.   Rehab potential is excellent.  Frequency:  1 X week, once daily  Duration:  for 10 visits  Discharge Plan:  Achieve all LTG.  Independent in home treatment program.  Reach maximal therapeutic benefit.    Thank you for your referral.    INQUIRIES  Therapist: Tammie Jones PT, [facility] [address] [address]  Phone: [phone]  Fax: [phone]

## 2022-05-31 NOTE — PROGRESS NOTES
Physical Therapy Initial Evaluation  Subjective:  Pt reports insidious onset of L central  heel pain about three months ago, Feb 2022. He is runner,  And has completed ultra marathon distances. Hx of toe paresthesias in running event. He has progressively regained feeling in toes.  Pt also has hx of L ankle surgery 2x.   He is currently limited with WTB and walking in AM and after sitting for >1 hour. Wearing flip flops or shoes decreases the post sitting heel soreness.       The history is provided by the patient.                       Objective:  Standing Alignment:        Lumbar:  Lordosis decr            Gait:  Dec stance time L, dec stride length L , dec medial push off L     Deviations:  Ankle:  Push off decr L and heel strike decr L    Flexibility/Screens:   Positive screens:  Hip (mod loss L hip ER firm end feel ) and Lumbar (mod loss lumbar ext)    Lower Extremity:  Decreased left lower extremity flexibility:Hip IR's; Hip ER's; Hip Flexors; Gastroc and Soleus    Decreased right lower extremity flexibility:  Hip ER's; Hip Flexors and Soleus          Ankle/Foot Evaluation  ROM:    AROM:    Dorsiflexion: Left:   Mod loss vs R  Right:        Eversion: Mod loss     Right:       PROM:                  Endfeel:  Firm end feel L TCJ DF, rearfoot mod loss firm end feel eversion L,         PALPATION:   Left ankle tenderness present at:  gastroc/soleus; plantar fascia and peroneals    EDEMA: Edema ankle: mild L TCJ lateral           MOBILITY TESTING:     Tib-Fib Distal Left: hypomobile      Talocrural Left: hypomobile      Subtalar Left: hypomobile      Midtarsal Left: hypomobile      First Ray Left: hypomobile                                                        General     ROS    Assessment/Plan:    Patient is a 46 year old male with L foot complaints.    Patient has the following significant findings with corresponding treatment plan.                Diagnosis 1:  Plantar fasciitis L   Pain -  hot/cold therapy,  manual therapy and self management  Decreased ROM/flexibility - manual therapy and therapeutic exercise  Decreased joint mobility - manual therapy and therapeutic exercise  Impaired gait - gait training  Impaired muscle performance - neuro re-education  Decreased function - therapeutic activities    Therapy Evaluation Codes:   1) History comprised of:   Personal factors that impact the plan of care:      None.    Comorbidity factors that impact the plan of care are:      None.     Medications impacting care: None.  2) Examination of Body Systems comprised of:   Body structures and functions that impact the plan of care:      Ankle, Hip and Lumbar spine.   Activity limitations that impact the plan of care are:      Running, Sitting, Sports, Stairs, Standing and Walking.  3) Clinical presentation characteristics are:   Stable/Uncomplicated.  4) Decision-Making    Low complexity using standardized patient assessment instrument and/or measureable assessment of functional outcome.  Cumulative Therapy Evaluation is: Low complexity.    Previous and current functional limitations:  (See Goal Flow Sheet for this information)    Short term and Long term goals: (See Goal Flow Sheet for this information)     Communication ability:  Patient appears to be able to clearly communicate and understand verbal and written communication and follow directions correctly.  Treatment Explanation - The following has been discussed with the patient:   RX ordered/plan of care  Anticipated outcomes  Possible risks and side effects  This patient would benefit from PT intervention to resume normal activities.   Rehab potential is excellent.    Frequency:  1 X week, once daily  Duration:  for 10 visits  Discharge Plan:  Achieve all LTG.  Independent in home treatment program.  Reach maximal therapeutic benefit.    Please refer to the daily flowsheet for treatment today, total treatment time and time spent performing 1:1 timed codes.

## 2022-06-01 NOTE — PROGRESS NOTES
Physical Therapy Initial Evaluation  Subjective:    Patient Health History                     Surgeries: L ankle scope 2x, lasik, tonsils 1977.    Current medications: Latanoprost eye drop.    Current occupation is [employer].                                       Objective:  System    Physical Exam    General     ROS    Assessment/Plan:

## 2022-08-09 ENCOUNTER — THERAPY VISIT (OUTPATIENT)
Dept: PHYSICAL THERAPY | Facility: CLINIC | Age: 47
End: 2022-08-09
Payer: COMMERCIAL

## 2022-08-09 DIAGNOSIS — M72.2 PLANTAR FASCIITIS: Primary | ICD-10-CM

## 2022-08-09 PROCEDURE — 97110 THERAPEUTIC EXERCISES: CPT | Mod: GP | Performed by: PHYSICAL THERAPIST

## 2022-08-09 PROCEDURE — 97140 MANUAL THERAPY 1/> REGIONS: CPT | Mod: GP | Performed by: PHYSICAL THERAPIST

## 2022-09-11 ENCOUNTER — HEALTH MAINTENANCE LETTER (OUTPATIENT)
Age: 47
End: 2022-09-11

## 2022-10-24 ENCOUNTER — THERAPY VISIT (OUTPATIENT)
Dept: PHYSICAL THERAPY | Facility: CLINIC | Age: 47
End: 2022-10-24
Payer: COMMERCIAL

## 2022-10-24 DIAGNOSIS — M72.2 PLANTAR FASCIITIS: Primary | ICD-10-CM

## 2022-10-24 PROCEDURE — 97110 THERAPEUTIC EXERCISES: CPT | Mod: GP | Performed by: PHYSICAL THERAPIST

## 2022-10-24 PROCEDURE — 97140 MANUAL THERAPY 1/> REGIONS: CPT | Mod: GP | Performed by: PHYSICAL THERAPIST

## 2023-01-03 ENCOUNTER — TRANSFERRED RECORDS (OUTPATIENT)
Dept: HEALTH INFORMATION MANAGEMENT | Facility: CLINIC | Age: 48
End: 2023-01-03

## 2023-01-11 ENCOUNTER — TRANSFERRED RECORDS (OUTPATIENT)
Dept: HEALTH INFORMATION MANAGEMENT | Facility: CLINIC | Age: 48
End: 2023-01-11

## 2023-05-17 NOTE — PROGRESS NOTES
Discharge Note    Progress reporting period is from initial eval/last PN to Aug 9, 2022.     Patient seen for 2 visits.    SUBJECTIVE  Subjective changes noted by patient:  Pt notes he has felt less overall L ankle foot pain with activity. He feels he has a bruised heel. And does get some blistering on heel. Mild R lateral knee pain . He was out of state and did some trail running.  .  Changes in function:  Yes (See Goal flowsheet attached for changes in current functional level)  Adverse reaction to treatment or activity: None    OBJECTIVE  Changes noted in objective findings: L ankle TCJ min loss post rx, rearfoot mod loss eversion improved to min loss post rx, mod lateral R quad restriction wtihout PROM loss R knee.     ASSESSMENT/PLAN      DIAGP:  The encounter diagnosis was Plantar fasciitis.  Updated problem list and treatment plan:   Decreased ROM/flexibility - HEP  Decreased function - HEP  STG/LTGs have been met or progress has been made towards goals:  Yes, please see goal flowsheet for most current information  Assessment of Progress: current status is unknown.    Last current status:     Self Management Plans:  HEP  I have re-evaluated this patient and find that the nature, scope, duration and intensity of the therapy is appropriate for the medical condition of the patient.  Geraldo continues to require the following intervention to meet STG and LTG's:  HEP.    Recommendations:  Discharge with current home program.  Patient to follow up with MD as needed.    Please refer to the daily flowsheet for treatment today, total treatment time and time spent performing 1:1 timed codes.

## 2023-06-03 ENCOUNTER — HEALTH MAINTENANCE LETTER (OUTPATIENT)
Age: 48
End: 2023-06-03

## 2024-07-07 ENCOUNTER — HEALTH MAINTENANCE LETTER (OUTPATIENT)
Age: 49
End: 2024-07-07

## 2024-09-19 NOTE — TELEPHONE ENCOUNTER
FUTURE VISIT INFORMATION      FUTURE VISIT INFORMATION:  Date: 10/30/24  Time: 9:45AM  Location: [facility]  REFERRAL INFORMATION:  Referring provider:    Referring providers clinic:    Reason for visit/diagnosis  per patient sepoplasty and turbinate reduction consult    RECORDS REQUESTED FROM:       Clinic name Comments Records Status Imaging Status   [facility]- Ov Ross Ortiz MD   8/18/15- Ov Tutu Pitts MD  CE

## 2024-10-30 ENCOUNTER — OFFICE VISIT (OUTPATIENT)
Dept: OTOLARYNGOLOGY | Facility: CLINIC | Age: 49
End: 2024-10-30
Payer: COMMERCIAL

## 2024-10-30 ENCOUNTER — PRE VISIT (OUTPATIENT)
Dept: OTOLARYNGOLOGY | Facility: CLINIC | Age: 49
End: 2024-10-30

## 2024-10-30 VITALS
SYSTOLIC BLOOD PRESSURE: 138 MMHG | HEIGHT: 75 IN | HEART RATE: 58 BPM | DIASTOLIC BLOOD PRESSURE: 86 MMHG | BODY MASS INDEX: 25.61 KG/M2 | WEIGHT: 206 LBS

## 2024-10-30 DIAGNOSIS — J34.89 NASAL VALVE STENOSIS: ICD-10-CM

## 2024-10-30 DIAGNOSIS — J34.2 DEVIATED NASAL SEPTUM: ICD-10-CM

## 2024-10-30 DIAGNOSIS — J34.3 HYPERTROPHY OF INFERIOR NASAL TURBINATE: ICD-10-CM

## 2024-10-30 DIAGNOSIS — J34.89 NASAL OBSTRUCTION: Primary | ICD-10-CM

## 2024-10-30 PROCEDURE — 92511 NASOPHARYNGOSCOPY: CPT | Performed by: OTOLARYNGOLOGY

## 2024-10-30 PROCEDURE — 99203 OFFICE O/P NEW LOW 30 MIN: CPT | Mod: 25 | Performed by: OTOLARYNGOLOGY

## 2024-10-30 NOTE — PROGRESS NOTES
Facial Plastic and Reconstructive Surgery Consultation    Referring Provider:   Referred Self, MD  No address on file    HPI:   I had the pleasure of seeing Geraldo Montero today in clinic for consultation for nasal obstruction.    Geraldo Montero is a 49 year old male with a history of significant nasal obstruction.  He notes several members of his family have had to have nasal surgeries due to the fact that there nose is collapse and also are not effective for nasal breathing.  He is an endurance athlete and ultramarathoner and notices that breathing when he is exerting himself is a significant difficulty.  He has used nasal cones at night and also Breathe Right strips.  He states that they help improve his breathing.  He has tried Flonase for several months over 3 with no significant benefit.  He does not recall any significant trauma to his nose nor has he had surgery on his nose.  His wife states that he is a loud breather however there is not any significant signs of possible obstructive sleep apnea on history.  He feels consistently debilitated through his nose all the time but primarily when he exerts himself.  He is interested in improving his functional nasal breathing.      Review Of Systems  ROS: 10 point ROS neg other than the symptoms noted above in the HPI.    Patient Active Problem List   Diagnosis    Cervicalgia    Pain in thoracic spine    Ulnar neuritis    Pain in joint involving ankle and foot    Disturbance of skin sensation    Impingement of left ulnar nerve    Chronic pain of left ankle    Patellofemoral pain syndrome of right knee    Right knee pain    Ankle sprain    Osteochondral defect of ankle    Plantar fasciitis     Past Surgical History:   Procedure Laterality Date    ANKLE SURGERY Left     x2, in [employer]     Current Outpatient Medications   Medication Sig Dispense Refill    latanoprost (XALATAN) 0.005 % ophthalmic solution INSTILL 1 DROP INTO AFFECTED EYE(S) BY OPHTHALMIC  ROUTE ONCE DAILY IN THE EVENING       Patient has no known allergies.  Social History     Socioeconomic History    Marital status:      Spouse name: Not on file    Number of children: Not on file    Years of education: Not on file    Highest education level: Not on file   Occupational History    Not on file   Tobacco Use    Smoking status: Never    Smokeless tobacco: Never   Substance and Sexual Activity    Alcohol use: No    Drug use: No    Sexual activity: Not on file   Other Topics Concern    Not on file   Social History Narrative    Not on file     Social Drivers of Health     Financial Resource Strain: Not on file   Food Insecurity: Not on file   Transportation Needs: Not on file   Physical Activity: Not on file   Stress: Not on file   Social Connections: Not on file   Interpersonal Safety: Not on file   Housing Stability: Not on file     Family History   Family history unknown: Yes     PE:  Alert and Oriented, Answering Questions Appropriately  Atraumatic, Normocephalic, Face Symmetric  Skin: [place] 2  Facial Nerve Intact and facial movement symmetric  EOM's, PEERL  Nasal Exam:  Slight reverse C-shaped deformity to the nose.  Significant narrow mid vault at baseline.  Cephalic malposition of the lower lateral cartilages bilaterally.  Anterior rhinoscopy shows lateral crural recurs which are that significant.  He has dynamic internal nasal valve collapse bilaterally.  Modified Romel maneuver leads to significant improvement in her nasal breathing bilaterally.  The left internal nasal valve is stenosed by the deviation of the septum.  The right turbinate is compensatorily enlarged.  No external Deformity, no mucopurulence or polyps, no masses  Chin: Normal   Lips/Teeth/Toungue/Gums: Lips intact, Normal Dentition, Occlusion intact, Oral mucosa intact, no lesions/ulcerations/masses, Tongue mobile  Neck: No lymphadenopathy, no thyromegaly, trachea midline  Chest: No wheezing, cyanosis, or  stridor  Card: Normal upper extremity pulses and capillary refill, not diaphoretic  Neuro/Psych: CN's 2-12 intact, Moves all extremities, ambulation in intact, positive affect, no notable muscle weakness                                           PROCEDURE:Diagnostic Nasal Endoscopy   Indication: Nasal obstruction  Performed by: Joanna Aranda      Consent was obtained. 0 degree sinus endoscope was used. Nasal Endoscopy is performed to provide a more thorough evaluation of the nasal airway than seen on standard nasal speculum exam.  Findings are as follows:                      Nasopharyngoscopy was performed and demonstrated the septal deviation as we see with a high deflection on the left this stenosing the left internal nasal valve.  We could visualize the dynamic internal nasal valve collapse on the right.  He has the septal deviation note however I was able to pass the scope to the nasopharynx bilaterally she does have a right inferior turbinate that is enlarged.  He tolerated the examination well without complications.        IMPRESSION:     Diagnoses of Nasal obstruction, Deviated nasal septum, and Nasal valve collapse, left static, right nasal valve collapse, dynamic were pertinent to this visit.  Right inferior turbinate hypertrophy         PLAN:     Geraldo Montero presents today after trialing Astelin in addition to nasal steroid spray for several months over 3 months with no significant improvement in her breathing.  He has notable structural deformities that would benefit from surgical correction to help him breathe.  He has a significant septal deviation that contributing to her obstruction but also leading to left external static nasal valve stenosis in addition to right internal nasal valve dynamic collapse.  He would benefit from an open approach complex septoplasty as the dorsal septum needs to be mobilized in addition to the caudal septum and would benefit from nasal valve repair  bilaterally.  I would do this with a repeat positioning possibly but for sure with the use of lateral crural strut grafts.  I discussed these with him today I also believe he would need bilateral  grafts. I would reduce the turbinates at the time.      We discussed the surgery today.  He is interested in proceeding.        Photodocumentation was obtained.     I spent a total of 35 minutes in the care of patient Geraldo Montero during today's office visit. This time includes reviewing the patient's chart and prior history, obtaining a history, performing an examination and evaluation and counseling the patient. This time also includes ordering mediations or tests necessary in addition to communication to other member's of the patient's health care team. Time spent in documentation and care coordination is included.

## 2024-10-30 NOTE — LETTER
10/30/2024       RE: Geraldo Montero  [address]  [place] [address]     Dear Colleague,    Thank you for referring your patient, Geraldo Montero, to the [facility] NO[facility] THROAT [facility]INIC [place] at [facility]. Please see a copy of my visit note below.    Facial Plastic and Reconstructive Surgery Consultation    Referring Provider:   Referred Self, MD  No address on file    HPI:   I had the pleasure of seeing Geraldo Montero today in clinic for consultation for nasal obstruction.    Geraldo Montero is a 49 year old male with a history of significant nasal obstruction.  He notes several members of his family have had to have nasal surgeries due to the fact that there nose is collapse and also are not effective for nasal breathing.  He is an endurance athlete and ultramarathoner and notices that breathing when he is exerting himself is a significant difficulty.  He has used nasal cones at night and also Breathe Right strips.  He states that they help improve his breathing.  He has tried Flonase for several months over 3 with no significant benefit.  He does not recall any significant trauma to his nose nor has he had surgery on his nose.  His wife states that he is a loud breather however there is not any significant signs of possible obstructive sleep apnea on history.  He feels consistently debilitated through his nose all the time but primarily when he exerts himself.  He is interested in improving his functional nasal breathing.      Review Of Systems  ROS: 10 point ROS neg other than the symptoms noted above in the HPI.    Patient Active Problem List   Diagnosis     Cervicalgia     Pain in thoracic spine     Ulnar neuritis     Pain in joint involving ankle and foot     Disturbance of skin sensation     Impingement of left ulnar nerve     Chronic pain of left ankle     Patellofemoral pain syndrome of right knee      Right knee pain     Ankle sprain     Osteochondral defect of ankle     Plantar fasciitis     Past Surgical History:   Procedure Laterality Date     ANKLE SURGERY Left     x2, in [employer]     Current Outpatient Medications   Medication Sig Dispense Refill     latanoprost (XALATAN) 0.005 % ophthalmic solution INSTILL 1 DROP INTO AFFECTED EYE(S) BY OPHTHALMIC ROUTE ONCE DAILY IN THE EVENING       Patient has no known allergies.  Social History     Socioeconomic History     Marital status:      Spouse name: Not on file     Number of children: Not on file     Years of education: Not on file     Highest education level: Not on file   Occupational History     Not on file   Tobacco Use     Smoking status: Never     Smokeless tobacco: Never   Substance and Sexual Activity     Alcohol use: No     Drug use: No     Sexual activity: Not on file   Other Topics Concern     Not on file   Social History Narrative     Not on file     Social Drivers of Health     Financial Resource Strain: Not on file   Food Insecurity: Not on file   Transportation Needs: Not on file   Physical Activity: Not on file   Stress: Not on file   Social Connections: Not on file   Interpersonal Safety: Not on file   Housing Stability: Not on file     Family History   Family history unknown: Yes     PE:  Alert and Oriented, Answering Questions Appropriately  Atraumatic, Normocephalic, Face Symmetric  Skin: Monroe 2  Facial Nerve Intact and facial movement symmetric  EOM's, PEERL  Nasal Exam:  Slight reverse C-shaped deformity to the nose.  Significant narrow mid vault at baseline.  Cephalic malposition of the lower lateral cartilages bilaterally.  Anterior rhinoscopy shows lateral crural recurs which are that significant.  He has dynamic internal nasal valve collapse bilaterally.  Modified Rice maneuver leads to significant improvement in her nasal breathing bilaterally.  The left internal nasal valve is stenosed by the deviation of the septum.  The  right turbinate is compensatorily enlarged.  No external Deformity, no mucopurulence or polyps, no masses  Chin: Normal   Lips/Teeth/Toungue/Gums: Lips intact, Normal Dentition, Occlusion intact, Oral mucosa intact, no lesions/ulcerations/masses, Tongue mobile  Neck: No lymphadenopathy, no thyromegaly, trachea midline  Chest: No wheezing, cyanosis, or stridor  Card: Normal upper extremity pulses and capillary refill, not diaphoretic  Neuro/Psych: CN's 2-12 intact, Moves all extremities, ambulation in intact, positive affect, no notable muscle weakness                                           PROCEDURE:Diagnostic Nasal Endoscopy   Indication: Nasal obstruction  Performed by: Joanna Aranda      Consent was obtained. 0 degree sinus endoscope was used. Nasal Endoscopy is performed to provide a more thorough evaluation of the nasal airway than seen on standard nasal speculum exam.  Findings are as follows:                      Nasopharyngoscopy was performed and demonstrated the septal deviation as we see with a high deflection on the left this stenosing the left internal nasal valve.  We could visualize the dynamic internal nasal valve collapse on the right.  He has the septal deviation note however I was able to pass the scope to the nasopharynx bilaterally she does have a right inferior turbinate that is enlarged.  He tolerated the examination well without complications.        IMPRESSION:     Diagnoses of Nasal obstruction, Deviated nasal septum, and Nasal valve collapse, left static, right nasal valve collapse, dynamic were pertinent to this visit.  Right inferior turbinate hypertrophy         PLAN:     Geraldo Montero presents today after trialing Astelin in addition to nasal steroid spray for several months over 3 months with no significant improvement in her breathing.  He has notable structural deformities that would benefit from surgical correction to help him breathe.  He has a significant septal  deviation that contributing to her obstruction but also leading to left external static nasal valve stenosis in addition to right internal nasal valve dynamic collapse.  He would benefit from an open approach complex septoplasty as the dorsal septum needs to be mobilized in addition to the caudal septum and would benefit from nasal valve repair bilaterally.  I would do this with a repeat positioning possibly but for sure with the use of lateral crural strut grafts.  I discussed these with him today I also believe he would need bilateral  grafts. I would reduce the turbinates at the time.      We discussed the surgery today.  He is interested in proceeding.        Photodocumentation was obtained.     I spent a total of 35 minutes in the care of patient Geraldo Montero during today's office visit. This time includes reviewing the patient's chart and prior history, obtaining a history, performing an examination and evaluation and counseling the patient. This time also includes ordering mediations or tests necessary in addition to communication to other member's of the patient's health care team. Time spent in documentation and care coordination is included.           Again, thank you for allowing me to participate in the care of your patient.      Sincerely,    Joanna Aranda MD

## 2024-11-06 ENCOUNTER — PREP FOR PROCEDURE (OUTPATIENT)
Dept: OTOLARYNGOLOGY | Facility: CLINIC | Age: 49
End: 2024-11-06
Payer: COMMERCIAL

## 2024-11-06 DIAGNOSIS — J34.8202 INTERNAL NASAL VALVE COLLAPSE, DYNAMIC: ICD-10-CM

## 2024-11-06 DIAGNOSIS — J34.8201 INTERNAL NASAL VALVE COLLAPSE, STATIC: ICD-10-CM

## 2024-11-06 DIAGNOSIS — J34.2 NASAL SEPTAL DEVIATION: Primary | ICD-10-CM

## 2024-11-06 DIAGNOSIS — J34.3 HYPERTROPHY OF BOTH INFERIOR NASAL TURBINATES: ICD-10-CM

## 2024-11-06 RX ORDER — CEFAZOLIN SODIUM 2 G/50ML
2 SOLUTION INTRAVENOUS
OUTPATIENT
Start: 2024-11-06

## 2024-11-06 RX ORDER — CEFAZOLIN SODIUM 2 G/50ML
2 SOLUTION INTRAVENOUS SEE ADMIN INSTRUCTIONS
OUTPATIENT
Start: 2024-11-06

## 2024-11-11 ENCOUNTER — HOSPITAL ENCOUNTER (OUTPATIENT)
Facility: AMBULATORY SURGERY CENTER | Age: 49
End: 2024-11-11
Attending: OTOLARYNGOLOGY
Payer: COMMERCIAL

## 2024-11-11 ENCOUNTER — TELEPHONE (OUTPATIENT)
Dept: OTOLARYNGOLOGY | Facility: CLINIC | Age: 49
End: 2024-11-11
Payer: COMMERCIAL

## 2024-11-11 PROBLEM — J34.2 NASAL SEPTAL DEVIATION: Status: ACTIVE | Noted: 2024-11-06

## 2024-11-11 PROBLEM — J34.3 HYPERTROPHY OF BOTH INFERIOR NASAL TURBINATES: Status: ACTIVE | Noted: 2024-11-06

## 2024-11-11 PROBLEM — J34.8201: Status: ACTIVE | Noted: 2024-11-06

## 2024-11-11 PROBLEM — J34.8202: Status: ACTIVE | Noted: 2024-11-06

## 2024-11-11 NOTE — NURSING NOTE
Photodocumentation obtained.    Orders placed for nasal surgery with Dr. Aranda.    Gretchen Walsh RN  11/11/2024 1:43 PM

## 2024-11-11 NOTE — TELEPHONE ENCOUNTER
Called patient to schedule surgery with Dr. Aranda, no answer. Callback number [phone] left on .     Xena Montalvo on 11/11/2024 at 1:23 PM

## 2024-11-11 NOTE — TELEPHONE ENCOUNTER
Patient is scheduled for surgery with Dr. Aranda.     Spoke with: Patient     Date of Surgery: 2/27/25    Location: [facility] OR     Pre op with Provider: SCOOTER     H&P: Patient is scheduled for an in clinic visit with PAC on 2/13/25 at 10:30.     Additional imaging/appointments: Patient is scheduled for a 1 week nurse visit on 3/05/25 at 10:30.     Surgery packet: Will send packet through [employer]. Patient made aware arrival time for surgery will not be listed within packet.      Additional comments: Will let patient know should an earlier surgery date become available.         Xena Montalvo on 11/11/2024 at 3:37 PM

## 2024-11-12 NOTE — TELEPHONE ENCOUNTER
FUTURE VISIT INFORMATION      SURGERY INFORMATION:  Date: 2/27/25  Location:  OR  Surgeon:  Joanna Aranda MD   Anesthesia Type:  general  Procedure: Open Complex Septoplasty, Repair of Nasal Vestibular Stenosis, Bilateral Inferior Turbinate Reduction   Consult: ov 10/30/24    RECORDS REQUESTED FROM:       Primary Care Provider: Ross Mackay MD  - [facility]    Most recent EKG+ Tracing: 10/11/20- [facility]

## 2025-01-06 DIAGNOSIS — Z98.890 POSTOPERATIVE STATE: Primary | ICD-10-CM

## 2025-01-06 RX ORDER — ACETAMINOPHEN 500 MG
500 TABLET ORAL EVERY 6 HOURS PRN
Qty: 30 TABLET | Refills: 0 | Status: SHIPPED | OUTPATIENT
Start: 2025-01-06

## 2025-01-06 RX ORDER — CEPHALEXIN 500 MG/1
500 CAPSULE ORAL 2 TIMES DAILY
Qty: 14 CAPSULE | Refills: 0 | Status: SHIPPED | OUTPATIENT
Start: 2025-01-06 | End: 2025-01-13

## 2025-01-06 RX ORDER — OXYCODONE HYDROCHLORIDE 5 MG/1
5 TABLET ORAL EVERY 6 HOURS PRN
Qty: 25 TABLET | Refills: 0 | Status: SHIPPED | OUTPATIENT
Start: 2025-01-06

## 2025-01-06 RX ORDER — ONDANSETRON 4 MG/1
4 TABLET, ORALLY DISINTEGRATING ORAL EVERY 8 HOURS PRN
Qty: 12 TABLET | Refills: 0 | Status: SHIPPED | OUTPATIENT
Start: 2025-01-06

## 2025-01-07 ENCOUNTER — TRANSFERRED RECORDS (OUTPATIENT)
Dept: HEALTH INFORMATION MANAGEMENT | Facility: CLINIC | Age: 50
End: 2025-01-07
Payer: COMMERCIAL

## 2025-01-15 ENCOUNTER — ALLIED HEALTH/NURSE VISIT (OUTPATIENT)
Dept: OTOLARYNGOLOGY | Facility: CLINIC | Age: 50
End: 2025-01-15
Payer: COMMERCIAL

## 2025-01-15 DIAGNOSIS — J34.89 NASAL OBSTRUCTION: Primary | ICD-10-CM

## 2025-01-15 NOTE — PROGRESS NOTES
Pt is POD #8 s/p Open Complex Septoplasty, Repair of Nasal Vestibular Stenosis, Bilateral Inferior Turbinate Reduction.    Nasal cast and bilateral Martinez splints removed. Pt's nose is appropriately swollen and tender. Pt said he has had minimal pain and discomfort over the past week.    Bilateral nasal passageways suctioned of excess mucous and crusting.    Pt pleased with improvement in nasal breathing post splint removal.    Pt instructed to continue with frequent use of nasal saline spray and follow-up in 4-6 wks.    Gretchen Walsh RN  1/15/2025 4:04 PM

## 2025-02-13 ENCOUNTER — PRE VISIT (OUTPATIENT)
Dept: SURGERY | Facility: CLINIC | Age: 50
End: 2025-02-13

## 2025-02-19 ENCOUNTER — ALLIED HEALTH/NURSE VISIT (OUTPATIENT)
Dept: OTOLARYNGOLOGY | Facility: CLINIC | Age: 50
End: 2025-02-19
Payer: COMMERCIAL

## 2025-02-19 DIAGNOSIS — Z98.890 POSTOPERATIVE STATE: Primary | ICD-10-CM

## 2025-02-19 PROCEDURE — 99207 PR NO CHARGE NURSE ONLY: CPT

## 2025-02-19 NOTE — PROGRESS NOTES
Pt comes in PO 1/7/25 Open Complex Septoplasty, Repair of Nasal Vestibular Stenosis, Bilateral Inferior Turbinate Reduction.    Nasal tenderness and swelling has improved since last visit.  Pt pleased with improvement in nasal breathing and notices the improvement most when exercising.    Septum is midline.  Nasal mucosa pink and moist.  Notable edema present in bilateral nasal sidewalls.    Pt instructed to continue with frequent use of nasal saline spray and follow-up with Dr. Aranda in 3 mos.    Gretchen Walsh RN  2/19/2025 2:47 PM

## 2025-05-28 ENCOUNTER — OFFICE VISIT (OUTPATIENT)
Dept: OTOLARYNGOLOGY | Facility: CLINIC | Age: 50
End: 2025-05-28
Payer: COMMERCIAL

## 2025-05-28 VITALS — BODY MASS INDEX: 25.49 KG/M2 | WEIGHT: 205 LBS | HEIGHT: 75 IN

## 2025-05-28 DIAGNOSIS — Z98.890 POSTOPERATIVE STATE: Primary | ICD-10-CM

## 2025-05-28 PROCEDURE — 99212 OFFICE O/P EST SF 10 MIN: CPT | Performed by: OTOLARYNGOLOGY

## 2025-05-28 NOTE — LETTER
5/28/2025       RE: Geraldo Montero  [address]  [place] [address]     Dear Colleague,    Thank you for referring your patient, Geraldo Montero, to the [facility] EAR NOSE AND THROAT CLINIC [place] at [facility]. Please see a copy of my visit note below.    Facial Plastic and Reconstructive Surgery      Geraldo Montero is here for post operativw follow up 4-5 months after complex nasal surgery.  He notes he is breathing better but does feel that when he runs he has to clear mucous  purposely. He also feels a difference from one side of the nose to the other when he feels his nostrils.     His septum is nicely straight. He is swollen at the columella and the medial crural footplates are prominent.   We will watch as this heals and his swelling goes down.   I will see him back in 3 months     Again, thank you for allowing me to participate in the care of your patient.      Sincerely,    Joanna Aranda MD

## 2025-05-28 NOTE — PROGRESS NOTES
Facial Plastic and Reconstructive Surgery      Juanadan Montero is here for post operativw follow up 4-5 months after complex nasal surgery.  He notes he is breathing better but does feel that when he runs he has to clear mucous  purposely. He also feels a difference from one side of the nose to the other when he feels his nostrils.     His septum is nicely straight. He is swollen at the columella and the medial crural footplates are prominent.   We will watch as this heals and his swelling goes down.   I will see him back in 3 months